# Patient Record
Sex: FEMALE | Race: WHITE | NOT HISPANIC OR LATINO | Employment: FULL TIME | URBAN - METROPOLITAN AREA
[De-identification: names, ages, dates, MRNs, and addresses within clinical notes are randomized per-mention and may not be internally consistent; named-entity substitution may affect disease eponyms.]

---

## 2018-03-07 ENCOUNTER — OFFICE VISIT (OUTPATIENT)
Dept: URGENT CARE | Facility: CLINIC | Age: 28
End: 2018-03-07
Payer: COMMERCIAL

## 2018-03-07 VITALS
HEART RATE: 88 BPM | DIASTOLIC BLOOD PRESSURE: 68 MMHG | RESPIRATION RATE: 20 BRPM | HEIGHT: 67 IN | TEMPERATURE: 97.7 F | SYSTOLIC BLOOD PRESSURE: 110 MMHG | OXYGEN SATURATION: 98 % | BODY MASS INDEX: 23.23 KG/M2 | WEIGHT: 148 LBS

## 2018-03-07 DIAGNOSIS — J06.9 UPPER RESPIRATORY TRACT INFECTION, UNSPECIFIED TYPE: ICD-10-CM

## 2018-03-07 PROCEDURE — 99203 OFFICE O/P NEW LOW 30 MIN: CPT | Mod: 25 | Performed by: PHYSICIAN ASSISTANT

## 2018-03-07 RX ORDER — CODEINE PHOSPHATE AND GUAIFENESIN 10; 100 MG/5ML; MG/5ML
5 SOLUTION ORAL EVERY 4 HOURS PRN
Qty: 120 ML | Refills: 0 | Status: SHIPPED | OUTPATIENT
Start: 2018-03-07 | End: 2018-03-17

## 2018-03-07 RX ORDER — DEXAMETHASONE SODIUM PHOSPHATE 10 MG/ML
10 INJECTION INTRAMUSCULAR; INTRAVENOUS ONCE
Status: COMPLETED | OUTPATIENT
Start: 2018-03-07 | End: 2018-03-07

## 2018-03-07 RX ORDER — AZITHROMYCIN 250 MG/1
TABLET, FILM COATED ORAL
Qty: 6 TAB | Refills: 0 | Status: SHIPPED | OUTPATIENT
Start: 2018-03-07 | End: 2022-01-28

## 2018-03-07 RX ADMIN — DEXAMETHASONE SODIUM PHOSPHATE 10 MG: 10 INJECTION INTRAMUSCULAR; INTRAVENOUS at 18:38

## 2018-03-07 ASSESSMENT — PATIENT HEALTH QUESTIONNAIRE - PHQ9: CLINICAL INTERPRETATION OF PHQ2 SCORE: 0

## 2018-03-07 NOTE — LETTER
March 7, 2018         Patient: Kristel Dela Cruz   YOB: 1990   Date of Visit: 3/7/2018           To Whom it May Concern:    Kristel Dela Cruz was seen in my clinic on 3/7/2018.  Please excuse her from work 3/8-3/9/2018  If you have any questions or concerns, please don't hesitate to call.        Sincerely,           Emilio Olsen P.A.-C.  Electronically Signed

## 2018-03-08 NOTE — PATIENT INSTRUCTIONS
"Upper Respiratory Infection, Adult  Most upper respiratory infections (URIs) are a viral infection of the air passages leading to the lungs. A URI affects the nose, throat, and upper air passages. The most common type of URI is nasopharyngitis and is typically referred to as \"the common cold.\"  URIs run their course and usually go away on their own. Most of the time, a URI does not require medical attention, but sometimes a bacterial infection in the upper airways can follow a viral infection. This is called a secondary infection. Sinus and middle ear infections are common types of secondary upper respiratory infections.  Bacterial pneumonia can also complicate a URI. A URI can worsen asthma and chronic obstructive pulmonary disease (COPD). Sometimes, these complications can require emergency medical care and may be life threatening.  What are the causes?  Almost all URIs are caused by viruses. A virus is a type of germ and can spread from one person to another.  What increases the risk?  You may be at risk for a URI if:  · You smoke.  · You have chronic heart or lung disease.  · You have a weakened defense (immune) system.  · You are very young or very old.  · You have nasal allergies or asthma.  · You work in crowded or poorly ventilated areas.  · You work in health care facilities or schools.  What are the signs or symptoms?  Symptoms typically develop 2-3 days after you come in contact with a cold virus. Most viral URIs last 7-10 days. However, viral URIs from the influenza virus (flu virus) can last 14-18 days and are typically more severe. Symptoms may include:  · Runny or stuffy (congested) nose.  · Sneezing.  · Cough.  · Sore throat.  · Headache.  · Fatigue.  · Fever.  · Loss of appetite.  · Pain in your forehead, behind your eyes, and over your cheekbones (sinus pain).  · Muscle aches.  How is this diagnosed?  Your health care provider may diagnose a URI by:  · Physical exam.  · Tests to check that your " symptoms are not due to another condition such as:  ¨ Strep throat.  ¨ Sinusitis.  ¨ Pneumonia.  ¨ Asthma.  How is this treated?  A URI goes away on its own with time. It cannot be cured with medicines, but medicines may be prescribed or recommended to relieve symptoms. Medicines may help:  · Reduce your fever.  · Reduce your cough.  · Relieve nasal congestion.  Follow these instructions at home:  · Take medicines only as directed by your health care provider.  · Gargle warm saltwater or take cough drops to comfort your throat as directed by your health care provider.  · Use a warm mist humidifier or inhale steam from a shower to increase air moisture. This may make it easier to breathe.  · Drink enough fluid to keep your urine clear or pale yellow.  · Eat soups and other clear broths and maintain good nutrition.  · Rest as needed.  · Return to work when your temperature has returned to normal or as your health care provider advises. You may need to stay home longer to avoid infecting others. You can also use a face mask and careful hand washing to prevent spread of the virus.  · Increase the usage of your inhaler if you have asthma.  · Do not use any tobacco products, including cigarettes, chewing tobacco, or electronic cigarettes. If you need help quitting, ask your health care provider.  How is this prevented?  The best way to protect yourself from getting a cold is to practice good hygiene.  · Avoid oral or hand contact with people with cold symptoms.  · Wash your hands often if contact occurs.  There is no clear evidence that vitamin C, vitamin E, echinacea, or exercise reduces the chance of developing a cold. However, it is always recommended to get plenty of rest, exercise, and practice good nutrition.  Contact a health care provider if:  · You are getting worse rather than better.  · Your symptoms are not controlled by medicine.  · You have chills.  · You have worsening shortness of breath.  · You have brown  or red mucus.  · You have yellow or brown nasal discharge.  · You have pain in your face, especially when you bend forward.  · You have a fever.  · You have swollen neck glands.  · You have pain while swallowing.  · You have white areas in the back of your throat.  Get help right away if:  · You have severe or persistent:  ¨ Headache.  ¨ Ear pain.  ¨ Sinus pain.  ¨ Chest pain.  · You have chronic lung disease and any of the following:  ¨ Wheezing.  ¨ Prolonged cough.  ¨ Coughing up blood.  ¨ A change in your usual mucus.  · You have a stiff neck.  · You have changes in your:  ¨ Vision.  ¨ Hearing.  ¨ Thinking.  ¨ Mood.  This information is not intended to replace advice given to you by your health care provider. Make sure you discuss any questions you have with your health care provider.  Document Released: 06/13/2002 Document Revised: 08/20/2017 Document Reviewed: 03/25/2015  [employer] Interactive Patient Education © 2017 [employer]

## 2018-03-09 ASSESSMENT — ENCOUNTER SYMPTOMS
HOARSE VOICE: 1
SPUTUM PRODUCTION: 1
PALPITATIONS: 0
WHEEZING: 0
SHORTNESS OF BREATH: 0
HEMOPTYSIS: 0
SORE THROAT: 1
FEVER: 0
CHILLS: 1
COUGH: 1

## 2018-03-10 NOTE — PROGRESS NOTES
"Subjective:      Kristel Dela Cruz is a 27 y.o. female who presents with Pharyngitis (X 6 days sorehtroat , hoarseness , stuffy nose .)            Pharyngitis    This is a new problem. The current episode started in the past 7 days. The problem has been unchanged. Associated symptoms include congestion, coughing and a hoarse voice. Pertinent negatives include no ear pain or shortness of breath. She has tried gargles and NSAIDs for the symptoms. The treatment provided mild relief.       Review of Systems   Constitutional: Positive for chills and malaise/fatigue. Negative for fever.   HENT: Positive for congestion, hoarse voice and sore throat. Negative for ear pain.    Respiratory: Positive for cough and sputum production. Negative for hemoptysis, shortness of breath and wheezing.    Cardiovascular: Negative for chest pain and palpitations.   All other systems reviewed and are negative.    PMH:  has no past medical history on file.  MEDS:   Current Outpatient Prescriptions:   •  azithromycin (ZITHROMAX) 250 MG Tab, Take 500 mg (2 Tabs) by mouth on day one; then take 250 mg (1 Tab) by mouth once daily for 4 days., Disp: 6 Tab, Rfl: 0  •  guaifenesin-codeine (CHERATUSSIN AC) Solution oral solution, Take 5 mL by mouth every four hours as needed for Cough for up to 10 days., Disp: 120 mL, Rfl: 0  ALLERGIES: No Known Allergies  SURGHX: History reviewed. No pertinent surgical history.  SOCHX:  reports that she quit smoking about 3 years ago. She has never used smokeless tobacco.  FH: Family history was reviewed, no pertinent findings to report  Medications, Allergies, and current problem list reviewed today in Epic       Objective:     /68   Pulse 88   Temp 36.5 °C (97.7 °F)   Resp 20   Ht 1.702 m (5' 7\")   Wt 67.1 kg (148 lb)   SpO2 98%   BMI 23.18 kg/m²      Physical Exam   Constitutional: She is oriented to person, place, and time. She appears well-developed and well-nourished. She is active.  Non-toxic " appearance. She does not have a sickly appearance. She does not appear ill. No distress. She is not intubated.   HENT:   Head: Normocephalic and atraumatic.   Right Ear: Hearing, tympanic membrane, external ear and ear canal normal.   Left Ear: Hearing, tympanic membrane, external ear and ear canal normal.   Nose: Nose normal.   Mouth/Throat: Uvula is midline, oropharynx is clear and moist and mucous membranes are normal.   Eyes: Conjunctivae, EOM and lids are normal.   Neck: Normal range of motion. Neck supple.   Cardiovascular: Regular rhythm, S1 normal, S2 normal and normal heart sounds.  Exam reveals no gallop and no friction rub.    No murmur heard.  Pulmonary/Chest: Effort normal and breath sounds normal. No accessory muscle usage. No apnea, no tachypnea and no bradypnea. She is not intubated. No respiratory distress. She has no decreased breath sounds. She has no wheezes. She has no rhonchi. She has no rales. She exhibits no tenderness.   Musculoskeletal: Normal range of motion.   Neurological: She is alert and oriented to person, place, and time.   Skin: Skin is warm and dry.   Psychiatric: She has a normal mood and affect. Her speech is normal and behavior is normal. Judgment and thought content normal.   Vitals reviewed.              Assessment/Plan:   Discussed most likely viral etiology.  Contingent antibiotic prescription given to patient to fill upon meeting criteria of guidelines discussed.     1. Upper respiratory tract infection, unspecified type    - azithromycin (ZITHROMAX) 250 MG Tab; Take 500 mg (2 Tabs) by mouth on day one; then take 250 mg (1 Tab) by mouth once daily for 4 days.  Dispense: 6 Tab; Refill: 0  - guaifenesin-codeine (CHERATUSSIN AC) Solution oral solution; Take 5 mL by mouth every four hours as needed for Cough for up to 10 days.  Dispense: 120 mL; Refill: 0  - dexamethasone (DECADRON) injection (check route below) 10 mg; Take 1 mL by mouth Once.    Differential diagnosis, natural  history, supportive care discussed. Follow-up with primary care provider within 7-10 days, emergency room precautions discussed.  Patient and/or family appears understanding of information.

## 2019-03-04 ENCOUNTER — HOSPITAL ENCOUNTER (OUTPATIENT)
Dept: HOSPITAL 8 - STAR | Age: 29
Discharge: HOME | End: 2019-03-04
Attending: OBSTETRICS & GYNECOLOGY
Payer: COMMERCIAL

## 2019-03-04 DIAGNOSIS — G89.29: ICD-10-CM

## 2019-03-04 DIAGNOSIS — N94.5: ICD-10-CM

## 2019-03-04 DIAGNOSIS — Z01.818: Primary | ICD-10-CM

## 2019-03-04 DIAGNOSIS — R10.2: ICD-10-CM

## 2019-03-04 LAB
ANION GAP SERPL CALC-SCNC: 3 MMOL/L (ref 5–15)
BASOPHILS # BLD AUTO: 0.05 X10^3/UL (ref 0–0.1)
BASOPHILS NFR BLD AUTO: 1 % (ref 0–1)
CALCIUM SERPL-MCNC: 8.7 MG/DL (ref 8.5–10.1)
CHLORIDE SERPL-SCNC: 109 MMOL/L (ref 98–107)
CREAT SERPL-MCNC: 0.62 MG/DL (ref 0.55–1.02)
CULTURE INDICATED?: NO
EOSINOPHIL # BLD AUTO: 0.33 X10^3/UL (ref 0–0.4)
EOSINOPHIL NFR BLD AUTO: 4 % (ref 1–7)
ERYTHROCYTE [DISTWIDTH] IN BLOOD BY AUTOMATED COUNT: 13.3 % (ref 9.6–15.2)
LYMPHOCYTES # BLD AUTO: 1.63 X10^3/UL (ref 1–3.4)
LYMPHOCYTES NFR BLD AUTO: 17 % (ref 22–44)
MCH RBC QN AUTO: 31.2 PG (ref 27–34.8)
MCHC RBC AUTO-ENTMCNC: 34.5 G/DL (ref 32.4–35.8)
MCV RBC AUTO: 90.5 FL (ref 80–100)
MD: NO
MICROSCOPIC: (no result)
MONOCYTES # BLD AUTO: 0.48 X10^3/UL (ref 0.2–0.8)
MONOCYTES NFR BLD AUTO: 5 % (ref 2–9)
NEUTROPHILS # BLD AUTO: 7.02 X10^3/UL (ref 1.8–6.8)
NEUTROPHILS NFR BLD AUTO: 74 % (ref 42–75)
PLATELET # BLD AUTO: 272 X10^3/UL (ref 130–400)
PMV BLD AUTO: 9.3 FL (ref 7.4–10.4)
RBC # BLD AUTO: 5.01 X10^6/UL (ref 3.82–5.3)

## 2019-03-04 PROCEDURE — 84703 CHORIONIC GONADOTROPIN ASSAY: CPT

## 2019-03-04 PROCEDURE — 36415 COLL VENOUS BLD VENIPUNCTURE: CPT

## 2019-03-04 PROCEDURE — 85025 COMPLETE CBC W/AUTO DIFF WBC: CPT

## 2019-03-04 PROCEDURE — 80048 BASIC METABOLIC PNL TOTAL CA: CPT

## 2019-03-04 PROCEDURE — 81003 URINALYSIS AUTO W/O SCOPE: CPT

## 2019-03-08 ENCOUNTER — HOSPITAL ENCOUNTER (OUTPATIENT)
Dept: HOSPITAL 8 - OUT | Age: 29
Discharge: HOME | End: 2019-03-08
Attending: OBSTETRICS & GYNECOLOGY
Payer: COMMERCIAL

## 2019-03-08 VITALS — BODY MASS INDEX: 23.77 KG/M2 | WEIGHT: 151.46 LBS | HEIGHT: 67 IN

## 2019-03-08 VITALS — DIASTOLIC BLOOD PRESSURE: 76 MMHG | SYSTOLIC BLOOD PRESSURE: 125 MMHG

## 2019-03-08 DIAGNOSIS — N80.2: ICD-10-CM

## 2019-03-08 DIAGNOSIS — N94.6: Primary | ICD-10-CM

## 2019-03-08 DIAGNOSIS — N83.8: ICD-10-CM

## 2019-03-08 DIAGNOSIS — Z88.8: ICD-10-CM

## 2019-03-08 LAB — HCG UR SG: 1.02 (ref 1–1.03)

## 2019-03-08 PROCEDURE — 88305 TISSUE EXAM BY PATHOLOGIST: CPT

## 2019-03-08 PROCEDURE — 88304 TISSUE EXAM BY PATHOLOGIST: CPT

## 2019-03-08 PROCEDURE — 58662 LAPAROSCOPY EXCISE LESIONS: CPT

## 2019-03-08 PROCEDURE — 58350 REOPEN FALLOPIAN TUBE: CPT

## 2019-03-08 PROCEDURE — 81025 URINE PREGNANCY TEST: CPT

## 2019-03-08 RX ADMIN — FENTANYL CITRATE PRN MCG: 50 INJECTION INTRAMUSCULAR; INTRAVENOUS at 09:07

## 2019-03-08 RX ADMIN — FENTANYL CITRATE PRN MCG: 50 INJECTION INTRAMUSCULAR; INTRAVENOUS at 09:17

## 2019-03-08 RX ADMIN — FENTANYL CITRATE PRN MCG: 50 INJECTION INTRAMUSCULAR; INTRAVENOUS at 09:26

## 2020-10-24 ENCOUNTER — HOSPITAL ENCOUNTER (EMERGENCY)
Dept: HOSPITAL 8 - ED | Age: 30
Discharge: HOME | End: 2020-10-24
Payer: COMMERCIAL

## 2020-10-24 VITALS — SYSTOLIC BLOOD PRESSURE: 105 MMHG | DIASTOLIC BLOOD PRESSURE: 54 MMHG

## 2020-10-24 VITALS — WEIGHT: 194.45 LBS | BODY MASS INDEX: 30.52 KG/M2 | HEIGHT: 67 IN

## 2020-10-24 DIAGNOSIS — N93.8: Primary | ICD-10-CM

## 2020-10-24 LAB
ALBUMIN SERPL-MCNC: 3.8 G/DL (ref 3.4–5)
ALP SERPL-CCNC: 46 U/L (ref 45–117)
ALT SERPL-CCNC: 20 U/L (ref 12–78)
ANION GAP SERPL CALC-SCNC: 8 MMOL/L (ref 5–15)
BASOPHILS # BLD AUTO: 0 X10^3/UL (ref 0–0.1)
BASOPHILS NFR BLD AUTO: 1 % (ref 0–1)
BILIRUB SERPL-MCNC: 0.6 MG/DL (ref 0.2–1)
CALCIUM SERPL-MCNC: 8.8 MG/DL (ref 8.5–10.1)
CHLORIDE SERPL-SCNC: 108 MMOL/L (ref 98–107)
CLUE CELLS: (no result)
CREAT SERPL-MCNC: 0.78 MG/DL (ref 0.55–1.02)
EOSINOPHIL # BLD AUTO: 0.1 X10^3/UL (ref 0–0.4)
EOSINOPHIL NFR BLD AUTO: 1 % (ref 1–7)
ERYTHROCYTE [DISTWIDTH] IN BLOOD BY AUTOMATED COUNT: 12.8 % (ref 9.6–15.2)
LYMPHOCYTES # BLD AUTO: 1.7 X10^3/UL (ref 1–3.4)
LYMPHOCYTES NFR BLD AUTO: 23 % (ref 22–44)
MCH RBC QN AUTO: 30.6 PG (ref 27–34.8)
MCHC RBC AUTO-ENTMCNC: 33.3 G/DL (ref 32.4–35.8)
MD: NO
MICROSCOPIC: (no result)
MONOCYTES # BLD AUTO: 0.4 X10^3/UL (ref 0.2–0.8)
MONOCYTES NFR BLD AUTO: 6 % (ref 2–9)
NEUTROPHILS # BLD AUTO: 5.3 X10^3/UL (ref 1.8–6.8)
NEUTROPHILS NFR BLD AUTO: 70 % (ref 42–75)
PLATELET # BLD AUTO: 275 X10^3/UL (ref 130–400)
PMV BLD AUTO: 9 FL (ref 7.4–10.4)
PROT SERPL-MCNC: 7.1 G/DL (ref 6.4–8.2)
RBC # BLD AUTO: 4.62 X10^6/UL (ref 3.82–5.3)
T VAGINALIS RRNA GENITAL QL PROBE: (no result)
WET PREP WBCS: (no result)

## 2020-10-24 PROCEDURE — 87591 N.GONORRHOEAE DNA AMP PROB: CPT

## 2020-10-24 PROCEDURE — 76830 TRANSVAGINAL US NON-OB: CPT

## 2020-10-24 PROCEDURE — 80053 COMPREHEN METABOLIC PANEL: CPT

## 2020-10-24 PROCEDURE — 36415 COLL VENOUS BLD VENIPUNCTURE: CPT

## 2020-10-24 PROCEDURE — 87491 CHLMYD TRACH DNA AMP PROBE: CPT

## 2020-10-24 PROCEDURE — 84703 CHORIONIC GONADOTROPIN ASSAY: CPT

## 2020-10-24 PROCEDURE — 87210 SMEAR WET MOUNT SALINE/INK: CPT

## 2020-10-24 PROCEDURE — 85025 COMPLETE CBC W/AUTO DIFF WBC: CPT

## 2020-10-24 PROCEDURE — 99284 EMERGENCY DEPT VISIT MOD MDM: CPT

## 2020-10-24 PROCEDURE — 87808 TRICHOMONAS ASSAY W/OPTIC: CPT

## 2020-10-24 PROCEDURE — 81003 URINALYSIS AUTO W/O SCOPE: CPT

## 2020-10-24 NOTE — NUR
LAPROSCOPY FOR ENDOMETRIOSIS IN 2019.  FREQUENT UTI'S SINCE PROCEDURE.  BRIGHT 
BLOOD W/ INTERCOURSE. HAS BEEN TO URGENT CARE MULTIPLE TIMES.  SAW DR GONSALEZ 
FOR SX.  DENIES STD HX.  DENIES PARTNER HAS STD HX.  "I JUST KNOW SOMETHING'S 
WRONG".  CURRENTLY C/O PAIN ACROSS LOWER BACK.  TYLENOL & ADVIL AT 0630.  + 
NAUSEA LAST NOC & THIS AM.  DENIES VOMITING, DIARRHEA, CONSTIPATION.   LMP: 2 
MONTHS AGO.  TAKING BIRTH CONTROL: WHITNEY.  GYN INCREASED WHITNEY DOSAGE RECENTLY.

## 2021-05-06 ENCOUNTER — IMMUNIZATION (OUTPATIENT)
Dept: FAMILY PLANNING/WOMEN'S HEALTH CLINIC | Facility: IMMUNIZATION CENTER | Age: 31
End: 2021-05-06
Payer: COMMERCIAL

## 2021-05-06 DIAGNOSIS — Z23 ENCOUNTER FOR VACCINATION: Primary | ICD-10-CM

## 2021-05-06 PROCEDURE — 91300 PFIZER SARS-COV-2 VACCINE: CPT

## 2021-05-06 PROCEDURE — 0001A PFIZER SARS-COV-2 VACCINE: CPT

## 2021-05-27 ENCOUNTER — IMMUNIZATION (OUTPATIENT)
Dept: FAMILY PLANNING/WOMEN'S HEALTH CLINIC | Facility: IMMUNIZATION CENTER | Age: 31
End: 2021-05-27
Attending: INTERNAL MEDICINE
Payer: COMMERCIAL

## 2021-05-27 DIAGNOSIS — Z23 ENCOUNTER FOR VACCINATION: Primary | ICD-10-CM

## 2021-05-27 PROCEDURE — 0002A PFIZER SARS-COV-2 VACCINE: CPT | Performed by: INTERNAL MEDICINE

## 2021-05-27 PROCEDURE — 91300 PFIZER SARS-COV-2 VACCINE: CPT | Performed by: INTERNAL MEDICINE

## 2022-01-28 ENCOUNTER — TELEMEDICINE (OUTPATIENT)
Dept: MEDICAL GROUP | Facility: IMAGING CENTER | Age: 32
End: 2022-01-28
Payer: COMMERCIAL

## 2022-01-28 VITALS — HEIGHT: 67 IN | BODY MASS INDEX: 24.33 KG/M2 | HEART RATE: 82 BPM | WEIGHT: 155 LBS

## 2022-01-28 DIAGNOSIS — J45.21 MILD INTERMITTENT ASTHMA WITH ACUTE EXACERBATION: ICD-10-CM

## 2022-01-28 DIAGNOSIS — Z76.89 ENCOUNTER TO ESTABLISH CARE WITH NEW DOCTOR: ICD-10-CM

## 2022-01-28 DIAGNOSIS — J18.9 COMMUNITY ACQUIRED PNEUMONIA, UNSPECIFIED LATERALITY: ICD-10-CM

## 2022-01-28 DIAGNOSIS — U07.1 COVID-19: ICD-10-CM

## 2022-01-28 PROBLEM — S92.353A CLOSED FRACTURE OF FIFTH METATARSAL BONE: Status: ACTIVE | Noted: 2021-12-29

## 2022-01-28 PROBLEM — J45.20 MILD INTERMITTENT ASTHMA: Status: ACTIVE | Noted: 2022-01-28

## 2022-01-28 PROCEDURE — 99214 OFFICE O/P EST MOD 30 MIN: CPT | Mod: 95 | Performed by: CLINICAL NURSE SPECIALIST

## 2022-01-28 RX ORDER — HYDROCODONE BITARTRATE AND ACETAMINOPHEN 5; 325 MG/1; MG/1
TABLET ORAL
COMMUNITY
End: 2023-06-19

## 2022-01-28 RX ORDER — TRAMADOL HYDROCHLORIDE 50 MG/1
TABLET ORAL
COMMUNITY
End: 2023-06-19

## 2022-01-28 RX ORDER — PHENAZOPYRIDINE HYDROCHLORIDE 200 MG/1
TABLET, FILM COATED ORAL
COMMUNITY
End: 2023-06-19

## 2022-01-28 RX ORDER — CITALOPRAM 20 MG/1
20 TABLET ORAL DAILY
COMMUNITY
Start: 2021-12-06 | End: 2023-06-19

## 2022-01-28 RX ORDER — IPRATROPIUM BROMIDE AND ALBUTEROL SULFATE 2.5; .5 MG/3ML; MG/3ML
3 SOLUTION RESPIRATORY (INHALATION) EVERY 4 HOURS PRN
Qty: 126 ML | Refills: 1 | Status: SHIPPED | OUTPATIENT
Start: 2022-01-28 | End: 2022-02-04

## 2022-01-28 RX ORDER — AZITHROMYCIN 250 MG/1
250 TABLET, FILM COATED ORAL DAILY
Qty: 6 TABLET | Refills: 0 | Status: SHIPPED | OUTPATIENT
Start: 2022-01-28 | End: 2023-06-19

## 2022-01-28 RX ORDER — ALBUTEROL SULFATE 90 UG/1
AEROSOL, METERED RESPIRATORY (INHALATION)
COMMUNITY
End: 2023-06-19 | Stop reason: SDUPTHER

## 2022-01-28 RX ORDER — MONTELUKAST SODIUM 10 MG/1
TABLET ORAL DAILY
COMMUNITY
Start: 2021-11-30

## 2022-01-28 ASSESSMENT — PAIN SCALES - GENERAL: PAINLEVEL: 4=SLIGHT-MODERATE PAIN

## 2022-01-28 NOTE — ASSESSMENT & PLAN NOTE
She usually takes Singluair and uses the albuterol inhaler intermittently. Lately she has been using albuterol 1-2 times when going to sleep.

## 2022-01-28 NOTE — PROGRESS NOTES
Telemedicine: Established Patient   This evaluation was conducted via Zoom using secure and encrypted videoconferencing technology. The patient was in a private location in the state of [place].    The patient's identity was confirmed and verbal consent was obtained for this virtual visit.    Subjective:   CC:   Chief Complaint   Patient presents with   • Establish Care   • Fever     covid last week   • Body Aches     x 1 week    • Pharyngitis     lost voice        HPI:  Judson was diagnosed with COVID-19 about 10 days ago and still has sore throat, loss of voice, chest congestion, nausea, small dry cough, low grade fevers, headaches, myalgias, SOB with laying flat or walking up the stairs.  Lost voice one weak ago.  No change in taste or smell, vomiting, diarrhea, joint pain.  She took Advil, DayQuil, Tylenol. Advil has worked the best.  Steamy showers help but do not relieve phlegm.  NO cough syrup, but DayQuil which does not help.  Drinking lots of water.  Boyfriend had COVID as well but he has resolved. She took azithromycin one month ago for a UTI.    ROS  See hPI    No Known Allergies    Current medicines (including changes today)  Current Outpatient Medications   Medication Sig Dispense Refill   • citalopram (CELEXA) 20 MG Tab      • montelukast (SINGULAIR) 10 MG Tab      • albuterol (VENTOLIN HFA) 108 (90 Base) MCG/ACT Aero Soln inhalation aerosol Ventolin HFA 90 mcg/actuation aerosol inhaler     • azithromycin (ZITHROMAX) 250 MG Tab Take 1 Tablet by mouth every day. 6 Tablet 0   • Nebulizers (COMPRESSOR/NEBULIZER) Misc 1 Each one time for 1 dose. 1 Each 0   • ipratropium-albuterol (DUONEB) 0.5-2.5 (3) MG/3ML nebulizer solution Take 3 mL by nebulization every four hours as needed for Shortness of Breath for up to 7 days. 126 mL 1     No current facility-administered medications for this visit.       Patient Active Problem List    Diagnosis Date Noted   • Mild intermittent asthma 01/28/2022   • Closed fracture of  fifth metatarsal bone 12/29/2021         History reviewed. No pertinent family history.    She  has no past medical history on file.  She  has no past surgical history on file.       Objective:     Physical Exam     Vitals obtained by patient:     Vitals:    01/28/22 1415   Pulse: 82       Constitutional: Alert, no distress, well-groomed.  Skin: No rashes in visible areas.  Eye: Round. Conjunctiva clear, lids normal. No icterus.   ENMT: Lips pink without lesions, good dentition, moist mucous membranes. Unable to speak above a whisper. No tonsillar enlargement or exudate. Submandibular lymph tender  Neck: No masses, no thyromegaly. Moves freely without pain.  CV: Pulse as reported by patient  Respiratory: Unlabored respiratory effort, no cough or audible wheeze  Psych: Alert and oriented x4, normal affect and mood.     Assessment and Plan:   The following treatment plan was discussed:     1. Encounter to establish care with new doctor  Judson is establishing care. She was diagnosed with COVID-19 10 days ago and felt she was improving but then worsened.    2. Mild intermittent asthma with acute exacerbation  Judson takes Singulair daily and albuterol as needed. She has increased her albuterol usage with her recent shortness of breath, 1-2 times nightly. During the visit, she was not in apparent respiratory distress although she reported a feeling of SOB.    3. COVID-19  Judson was diagnosed 10 days ago with COVID-19. She said she felt she was getting better yesterday but woke up today and felt poorly again. She has had intermittent low-grade fevers throughout, sore throat, mild cough, shortness of breath especially when supine or walking upstairs. No color change in her fingers and toes. Her breathing was unlabored during the visit. Tonsils were visualized and not enlarged, without exudate. It is unlikely she has concurrent strep. With the improvement and then worsening of symptoms including continued fevers, a feeling of  heavines and chest congestion, I am concerned she may have developed pneumonia. See #4 for pneumonia plan.    To soothe the throat, drink warm, herbal tea such as Throat Coat or ginger with honey and lemon. For cough, take guaifenesin as needed during the day and use throat lozenges such as Ricola.  Take 5-10 deep breaths intermittently throughout the day and move the body as tolerated to aid in respiration. Take Advil or Tylenol as needed for fever and body aches. Drink 2 to 3 L of water a day.    Judson should use the nebulizer up to four times a day to help with her breathing.     - Nebulizers (COMPRESSOR/NEBULIZER) Misc; 1 Each one time for 1 dose.  Dispense: 1 Each; Refill: 0  - ipratropium-albuterol (DUONEB) 0.5-2.5 (3) MG/3ML nebulizer solution; Take 3 mL by nebulization every four hours as needed for Shortness of Breath for up to 7 days.  Dispense: 126 mL; Refill: 1    4. Community acquired pneumonia, unspecified laterality  Judson may have pneumonia secondary to her COVID-19. Her symptoms improved and then worsened. She continues with chest congestion, dry cough unable to expectorate phlegm, low-grade fevers, body aches. She will be treated empirically and given a course of azithromycin. We will check in again at the completion of this medication. If this does not improve her symptoms, we will consider chest x-ray and potentially burst of steroids.  - azithromycin (ZITHROMAX) 250 MG Tab; Take 1 Tablet by mouth every day.  Dispense: 6 Tablet; Refill: 0    Return in about 5 days (around 2/2/2022), or if symptoms worsen or fail to improve, for treatment response.      Follow-up: Return in about 5 days (around 2/2/2022), or if symptoms worsen or fail to improve, for treatment response.

## 2022-03-03 ENCOUNTER — PRE-ADMISSION TESTING (OUTPATIENT)
Dept: ADMISSIONS | Facility: MEDICAL CENTER | Age: 32
End: 2022-03-03
Attending: ORTHOPAEDIC SURGERY
Payer: COMMERCIAL

## 2022-03-03 VITALS — WEIGHT: 155 LBS | BODY MASS INDEX: 24.33 KG/M2 | HEIGHT: 67 IN

## 2022-03-03 NOTE — DISCHARGE PLANNING
"DISCHARGE PLANNING NOTE -    Procedure: Procedure(s):  ORIF, FOOT - 5TH METATARSAL  Procedure Date: 3/4/2022  Insurance: Payor: AETNA / Plan: AETNA / Product Type: Multiple /    Equipment currently available at home?  crutches and knee scooter   Steps into the home? 0  Steps within the home?   Toilet height? Standard  5'7\"  Type of shower? tub-shower  Who will be with you during your recovery? mother, father  Is Outpatient Physical Therapy set up after surgery? No         Plan: Spoke with pt via telephone during preadmission appt. She has currently been NWB for 3 months.  Per pt, she will continue to be NWB for 4 weeks after surgery. She has no concerns at this time and has been managing well at home.   "

## 2022-03-04 ENCOUNTER — ANESTHESIA (OUTPATIENT)
Dept: SURGERY | Facility: MEDICAL CENTER | Age: 32
End: 2022-03-04
Payer: COMMERCIAL

## 2022-03-04 ENCOUNTER — HOSPITAL ENCOUNTER (OUTPATIENT)
Facility: MEDICAL CENTER | Age: 32
End: 2022-03-04
Attending: ORTHOPAEDIC SURGERY | Admitting: ORTHOPAEDIC SURGERY
Payer: COMMERCIAL

## 2022-03-04 ENCOUNTER — ANESTHESIA EVENT (OUTPATIENT)
Dept: SURGERY | Facility: MEDICAL CENTER | Age: 32
End: 2022-03-04
Payer: COMMERCIAL

## 2022-03-04 ENCOUNTER — APPOINTMENT (OUTPATIENT)
Dept: RADIOLOGY | Facility: MEDICAL CENTER | Age: 32
End: 2022-03-04
Attending: ORTHOPAEDIC SURGERY
Payer: COMMERCIAL

## 2022-03-04 VITALS
BODY MASS INDEX: 25.29 KG/M2 | TEMPERATURE: 98.6 F | OXYGEN SATURATION: 93 % | HEIGHT: 67 IN | DIASTOLIC BLOOD PRESSURE: 56 MMHG | HEART RATE: 68 BPM | SYSTOLIC BLOOD PRESSURE: 107 MMHG | WEIGHT: 161.16 LBS | RESPIRATION RATE: 16 BRPM

## 2022-03-04 LAB
EXTERNAL QUALITY CONTROL: NORMAL
HCG SERPL QL: NEGATIVE
SARS-COV+SARS-COV-2 AG RESP QL IA.RAPID: NEGATIVE

## 2022-03-04 PROCEDURE — C1713 ANCHOR/SCREW BN/BN,TIS/BN: HCPCS | Performed by: ORTHOPAEDIC SURGERY

## 2022-03-04 PROCEDURE — 160039 HCHG SURGERY MINUTES - EA ADDL 1 MIN LEVEL 3: Performed by: ORTHOPAEDIC SURGERY

## 2022-03-04 PROCEDURE — 160028 HCHG SURGERY MINUTES - 1ST 30 MINS LEVEL 3: Performed by: ORTHOPAEDIC SURGERY

## 2022-03-04 PROCEDURE — 160009 HCHG ANES TIME/MIN: Performed by: ORTHOPAEDIC SURGERY

## 2022-03-04 PROCEDURE — 160035 HCHG PACU - 1ST 60 MINS PHASE I: Performed by: ORTHOPAEDIC SURGERY

## 2022-03-04 PROCEDURE — 700111 HCHG RX REV CODE 636 W/ 250 OVERRIDE (IP): Performed by: ANESTHESIOLOGY

## 2022-03-04 PROCEDURE — 502240 HCHG MISC OR SUPPLY RC 0272: Performed by: ORTHOPAEDIC SURGERY

## 2022-03-04 PROCEDURE — 700105 HCHG RX REV CODE 258: Performed by: ORTHOPAEDIC SURGERY

## 2022-03-04 PROCEDURE — 501838 HCHG SUTURE GENERAL: Performed by: ORTHOPAEDIC SURGERY

## 2022-03-04 PROCEDURE — 160002 HCHG RECOVERY MINUTES (STAT): Performed by: ORTHOPAEDIC SURGERY

## 2022-03-04 PROCEDURE — 500881 HCHG PACK, EXTREMITY: Performed by: ORTHOPAEDIC SURGERY

## 2022-03-04 PROCEDURE — 84703 CHORIONIC GONADOTROPIN ASSAY: CPT

## 2022-03-04 PROCEDURE — 700101 HCHG RX REV CODE 250: Performed by: ORTHOPAEDIC SURGERY

## 2022-03-04 PROCEDURE — A9270 NON-COVERED ITEM OR SERVICE: HCPCS | Performed by: ANESTHESIOLOGY

## 2022-03-04 PROCEDURE — 36415 COLL VENOUS BLD VENIPUNCTURE: CPT

## 2022-03-04 PROCEDURE — 160025 RECOVERY II MINUTES (STATS): Performed by: ORTHOPAEDIC SURGERY

## 2022-03-04 PROCEDURE — 160046 HCHG PACU - 1ST 60 MINS PHASE II: Performed by: ORTHOPAEDIC SURGERY

## 2022-03-04 PROCEDURE — 700102 HCHG RX REV CODE 250 W/ 637 OVERRIDE(OP): Performed by: ANESTHESIOLOGY

## 2022-03-04 PROCEDURE — 160048 HCHG OR STATISTICAL LEVEL 1-5: Performed by: ORTHOPAEDIC SURGERY

## 2022-03-04 PROCEDURE — A9270 NON-COVERED ITEM OR SERVICE: HCPCS

## 2022-03-04 PROCEDURE — 73630 X-RAY EXAM OF FOOT: CPT | Mod: LT

## 2022-03-04 PROCEDURE — 87426 SARSCOV CORONAVIRUS AG IA: CPT | Performed by: ORTHOPAEDIC SURGERY

## 2022-03-04 PROCEDURE — 502000 HCHG MISC OR IMPLANTS RC 0278: Performed by: ORTHOPAEDIC SURGERY

## 2022-03-04 PROCEDURE — 700101 HCHG RX REV CODE 250: Performed by: ANESTHESIOLOGY

## 2022-03-04 PROCEDURE — 700102 HCHG RX REV CODE 250 W/ 637 OVERRIDE(OP)

## 2022-03-04 DEVICE — IMPLANTABLE DEVICE: Type: IMPLANTABLE DEVICE | Site: FOOT | Status: FUNCTIONAL

## 2022-03-04 RX ORDER — DIPHENHYDRAMINE HYDROCHLORIDE 50 MG/ML
12.5 INJECTION INTRAMUSCULAR; INTRAVENOUS
Status: DISCONTINUED | OUTPATIENT
Start: 2022-03-04 | End: 2022-03-04 | Stop reason: HOSPADM

## 2022-03-04 RX ORDER — LIDOCAINE HYDROCHLORIDE 20 MG/ML
INJECTION, SOLUTION EPIDURAL; INFILTRATION; INTRACAUDAL; PERINEURAL PRN
Status: DISCONTINUED | OUTPATIENT
Start: 2022-03-04 | End: 2022-03-04 | Stop reason: SURG

## 2022-03-04 RX ORDER — BUPIVACAINE HYDROCHLORIDE 5 MG/ML
INJECTION, SOLUTION EPIDURAL; INTRACAUDAL
Status: DISCONTINUED
Start: 2022-03-04 | End: 2022-03-04 | Stop reason: HOSPADM

## 2022-03-04 RX ORDER — ONDANSETRON 2 MG/ML
INJECTION INTRAMUSCULAR; INTRAVENOUS PRN
Status: DISCONTINUED | OUTPATIENT
Start: 2022-03-04 | End: 2022-03-04 | Stop reason: SURG

## 2022-03-04 RX ORDER — HYDROMORPHONE HYDROCHLORIDE 1 MG/ML
0.2 INJECTION, SOLUTION INTRAMUSCULAR; INTRAVENOUS; SUBCUTANEOUS
Status: DISCONTINUED | OUTPATIENT
Start: 2022-03-04 | End: 2022-03-04 | Stop reason: HOSPADM

## 2022-03-04 RX ORDER — OXYCODONE HCL 5 MG/5 ML
5 SOLUTION, ORAL ORAL
Status: COMPLETED | OUTPATIENT
Start: 2022-03-04 | End: 2022-03-04

## 2022-03-04 RX ORDER — MEPERIDINE HYDROCHLORIDE 25 MG/ML
12.5 INJECTION INTRAMUSCULAR; INTRAVENOUS; SUBCUTANEOUS
Status: DISCONTINUED | OUTPATIENT
Start: 2022-03-04 | End: 2022-03-04 | Stop reason: HOSPADM

## 2022-03-04 RX ORDER — MIDAZOLAM HYDROCHLORIDE 1 MG/ML
1 INJECTION INTRAMUSCULAR; INTRAVENOUS
Status: DISCONTINUED | OUTPATIENT
Start: 2022-03-04 | End: 2022-03-04 | Stop reason: HOSPADM

## 2022-03-04 RX ORDER — DEXAMETHASONE SODIUM PHOSPHATE 4 MG/ML
INJECTION, SOLUTION INTRA-ARTICULAR; INTRALESIONAL; INTRAMUSCULAR; INTRAVENOUS; SOFT TISSUE PRN
Status: DISCONTINUED | OUTPATIENT
Start: 2022-03-04 | End: 2022-03-04 | Stop reason: SURG

## 2022-03-04 RX ORDER — HYDROMORPHONE HYDROCHLORIDE 1 MG/ML
0.1 INJECTION, SOLUTION INTRAMUSCULAR; INTRAVENOUS; SUBCUTANEOUS
Status: DISCONTINUED | OUTPATIENT
Start: 2022-03-04 | End: 2022-03-04 | Stop reason: HOSPADM

## 2022-03-04 RX ORDER — OXYCODONE HCL 5 MG/5 ML
10 SOLUTION, ORAL ORAL
Status: COMPLETED | OUTPATIENT
Start: 2022-03-04 | End: 2022-03-04

## 2022-03-04 RX ORDER — ACETAMINOPHEN 500 MG
1000 TABLET ORAL ONCE
Status: COMPLETED | OUTPATIENT
Start: 2022-03-04 | End: 2022-03-04

## 2022-03-04 RX ORDER — HALOPERIDOL 5 MG/ML
1 INJECTION INTRAMUSCULAR
Status: DISCONTINUED | OUTPATIENT
Start: 2022-03-04 | End: 2022-03-04 | Stop reason: HOSPADM

## 2022-03-04 RX ORDER — ONDANSETRON 2 MG/ML
4 INJECTION INTRAMUSCULAR; INTRAVENOUS
Status: DISCONTINUED | OUTPATIENT
Start: 2022-03-04 | End: 2022-03-04 | Stop reason: HOSPADM

## 2022-03-04 RX ORDER — LABETALOL HYDROCHLORIDE 5 MG/ML
5 INJECTION, SOLUTION INTRAVENOUS
Status: DISCONTINUED | OUTPATIENT
Start: 2022-03-04 | End: 2022-03-04 | Stop reason: HOSPADM

## 2022-03-04 RX ORDER — CELECOXIB 200 MG/1
400 CAPSULE ORAL ONCE
Status: COMPLETED | OUTPATIENT
Start: 2022-03-04 | End: 2022-03-04

## 2022-03-04 RX ORDER — CEFAZOLIN SODIUM 1 G/3ML
INJECTION, POWDER, FOR SOLUTION INTRAMUSCULAR; INTRAVENOUS PRN
Status: DISCONTINUED | OUTPATIENT
Start: 2022-03-04 | End: 2022-03-04 | Stop reason: SURG

## 2022-03-04 RX ORDER — SODIUM CHLORIDE, SODIUM LACTATE, POTASSIUM CHLORIDE, CALCIUM CHLORIDE 600; 310; 30; 20 MG/100ML; MG/100ML; MG/100ML; MG/100ML
INJECTION, SOLUTION INTRAVENOUS CONTINUOUS
Status: DISCONTINUED | OUTPATIENT
Start: 2022-03-04 | End: 2022-03-04 | Stop reason: HOSPADM

## 2022-03-04 RX ORDER — BUPIVACAINE HYDROCHLORIDE 5 MG/ML
INJECTION, SOLUTION EPIDURAL; INTRACAUDAL
Status: DISCONTINUED | OUTPATIENT
Start: 2022-03-04 | End: 2022-03-04 | Stop reason: HOSPADM

## 2022-03-04 RX ORDER — HYDROMORPHONE HYDROCHLORIDE 1 MG/ML
0.4 INJECTION, SOLUTION INTRAMUSCULAR; INTRAVENOUS; SUBCUTANEOUS
Status: DISCONTINUED | OUTPATIENT
Start: 2022-03-04 | End: 2022-03-04 | Stop reason: HOSPADM

## 2022-03-04 RX ORDER — CELECOXIB 200 MG/1
CAPSULE ORAL
Status: COMPLETED
Start: 2022-03-04 | End: 2022-03-04

## 2022-03-04 RX ADMIN — OXYCODONE HYDROCHLORIDE 5 MG: 5 SOLUTION ORAL at 11:52

## 2022-03-04 RX ADMIN — FENTANYL CITRATE 50 MCG: 50 INJECTION, SOLUTION INTRAMUSCULAR; INTRAVENOUS at 10:56

## 2022-03-04 RX ADMIN — ONDANSETRON 4 MG: 2 INJECTION INTRAMUSCULAR; INTRAVENOUS at 09:37

## 2022-03-04 RX ADMIN — CELECOXIB 400 MG: 200 CAPSULE ORAL at 08:41

## 2022-03-04 RX ADMIN — DEXAMETHASONE SODIUM PHOSPHATE 8 MG: 4 INJECTION, SOLUTION INTRA-ARTICULAR; INTRALESIONAL; INTRAMUSCULAR; INTRAVENOUS; SOFT TISSUE at 09:37

## 2022-03-04 RX ADMIN — PROPOFOL 120 MG: 10 INJECTION, EMULSION INTRAVENOUS at 09:37

## 2022-03-04 RX ADMIN — CEFAZOLIN 2 G: 330 INJECTION, POWDER, FOR SOLUTION INTRAMUSCULAR; INTRAVENOUS at 09:39

## 2022-03-04 RX ADMIN — ACETAMINOPHEN 1000 MG: 500 TABLET ORAL at 08:41

## 2022-03-04 RX ADMIN — SODIUM CHLORIDE, POTASSIUM CHLORIDE, SODIUM LACTATE AND CALCIUM CHLORIDE: 600; 310; 30; 20 INJECTION, SOLUTION INTRAVENOUS at 09:32

## 2022-03-04 RX ADMIN — FENTANYL CITRATE 50 MCG: 50 INJECTION, SOLUTION INTRAMUSCULAR; INTRAVENOUS at 11:08

## 2022-03-04 RX ADMIN — SODIUM CHLORIDE, POTASSIUM CHLORIDE, SODIUM LACTATE AND CALCIUM CHLORIDE: 600; 310; 30; 20 INJECTION, SOLUTION INTRAVENOUS at 08:40

## 2022-03-04 RX ADMIN — LIDOCAINE HYDROCHLORIDE 100 MG: 20 INJECTION, SOLUTION EPIDURAL; INFILTRATION; INTRACAUDAL at 09:37

## 2022-03-04 ASSESSMENT — PAIN DESCRIPTION - PAIN TYPE
TYPE: SURGICAL PAIN

## 2022-03-04 NOTE — ANESTHESIA PROCEDURE NOTES
Airway    Date/Time: 3/4/2022 9:38 AM  Performed by: Afua Joshi M.D.  Authorized by: Afua Joshi M.D.     Location:  OR  Urgency:  Elective  Indications for Airway Management:  Anesthesia      Spontaneous Ventilation: absent    Sedation Level:  Deep  Preoxygenated: Yes    Mask Difficulty Assessment:  1 - vent by mask  Final Airway Type:  Supraglottic airway  Final Supraglottic Airway:  Standard LMA    SGA Size:  4  Number of Attempts at Approach:  1

## 2022-03-04 NOTE — ANESTHESIA PREPROCEDURE EVALUATION
0904 Case: [id number] Date/Time: 03/04/22 0915    Procedure: ORIF, FOOT - 5TH METATARSAL (Left )    Pre-op diagnosis: CLOSED FRACTURE OF FIFTH METATARSAL BONE OF LEFT FOOT    Location: [facility] ROOM 23 / SURGERY SAME DAY [facility]    Surgeons: Vinicio Iraheta M.D.      31yoF with asthma    No AC  Allergies to food  NPO      Relevant Problems   PULMONARY   (positive) Mild intermittent asthma       Physical Exam    Airway   Mallampati: II       Cardiovascular - normal exam     Dental - normal exam           Pulmonary - normal exam     Abdominal - normal exam     Neurological - normal exam                 Anesthesia Plan    ASA 2       Plan - general       Airway plan will be LMA          Induction: intravenous    Postoperative Plan: Postoperative administration of opioids is intended.    Pertinent diagnostic labs and testing reviewed    Informed Consent:    Anesthetic plan and risks discussed with patient.

## 2022-03-04 NOTE — DISCHARGE INSTRUCTIONS
ACTIVITY: Rest and take it easy for the first 24 hours.  A responsible adult is recommended to remain with you during that time.  It is normal to feel sleepy.  We encourage you to not do anything that requires balance, judgment or coordination.    MILD FLU-LIKE SYMPTOMS ARE NORMAL. YOU MAY EXPERIENCE GENERALIZED MUSCLE ACHES, THROAT IRRITATION, HEADACHE AND/OR SOME NAUSEA.    FOR 24 HOURS DO NOT:  Drive, operate machinery or run household appliances.  Drink beer or alcoholic beverages.   Make important decisions or sign legal documents.    SPECIAL INSTRUCTIONS: Non-weight bearing on left foot; may bathe with incision covered     DIET: To avoid nausea, slowly advance diet as tolerated, avoiding spicy or greasy foods for the first day.  Add more substantial food to your diet according to your physician's instructions.  Babies can be fed formula or breast milk as soon as they are hungry.  INCREASE FLUIDS AND FIBER TO AVOID CONSTIPATION.    FOLLOW-UP APPOINTMENT:  A follow-up appointment should be arranged with your doctor; call to schedule.    You should CALL YOUR PHYSICIAN if you develop:  Fever greater than 101 degrees F.  Pain not relieved by medication, or persistent nausea or vomiting.  Excessive bleeding (blood soaking through dressing) or unexpected drainage from the wound.  Extreme redness or swelling around the incision site, drainage of pus or foul smelling drainage.  Inability to urinate or empty your bladder within 8 hours.  Problems with breathing or chest pain.    You should call 911 if you develop problems with breathing or chest pain.  If you are unable to contact your doctor or surgical center, you should go to the nearest emergency room or urgent care center.  Physician's telephone #: [phone]    If any questions arise, call your doctor.  If your doctor is not available, please feel free to call the Surgical Center at [phone].     A registered nurse may call you a few days after your  surgery to see how you are doing after your procedure.    MEDICATIONS: Resume taking daily medication.  Take prescribed pain medication with food.  If no medication is prescribed, you may take non-aspirin pain medication if needed.  PAIN MEDICATION CAN BE VERY CONSTIPATING.  Take a stool softener or laxative such as senokot, pericolace, or milk of magnesia if needed.    Prescription given for Norco (hydrocodone-acetaminophen). You had a narcotic called Oxycodone at 12:00PM.      Last pain medication given at Tylenol and Celebrex (like ibuprofen) given at 9:00 am.    If your physician has prescribed pain medication that includes Acetaminophen (Tylenol), do not take additional Acetaminophen (Tylenol) while taking the prescribed medication.    Depression / Suicide Risk    As you are discharged from this [facility] facility, it is important to learn how to keep safe from harming yourself.    Recognize the warning signs:  · Abrupt changes in personality, positive or negative- including increase in energy   · Giving away possessions  · Change in eating patterns- significant weight changes-  positive or negative  · Change in sleeping patterns- unable to sleep or sleeping all the time   · Unwillingness or inability to communicate  · Depression  · Unusual sadness, discouragement and loneliness  · Talk of wanting to die  · Neglect of personal appearance   · Rebelliousness- reckless behavior  · Withdrawal from people/activities they love  · Confusion- inability to concentrate     If you or a loved one observes any of these behaviors or has concerns about self-harm, here's what you can do:  · Talk about it- your feelings and reasons for harming yourself  · Remove any means that you might use to hurt yourself (examples: pills, rope, extension cords, firearm)  · Get professional help from the community (Mental Health, Substance Abuse, psychological counseling)  · Do not be alone:Call your Safe Contact- someone whom you trust  who will be there for you.  · Call your local CRISIS HOTLINE 141-5616 or 437-495-7205  · Call your local Children's Mobile Crisis Response Team [place] (418) 386-6688 or www.Tryouts  · Call the toll free National Suicide Prevention Hotlines   · National Suicide Prevention Lifeline 265-029-MQBX (5995)  · National [employer] Line Network 800-SUICIDE (849-4048)

## 2022-03-04 NOTE — OP REPORT
DATE OF OPERATION: 3/4/2022     SURGEON: Vinicio Iraheta M.D.    ANESTHESIOLOGIST: Anesthesiologist: Afua Joshi M.D.    ANESTHESIA: General    SURGICAL FIRST ASST: None    PREOPERATIVE DIAGNOSIS: Left fifth metatarsal delayed union    POSTOPERATIVE DIAGNOSIS: Left fifth metatarsal delayed union    PROCEDURE: Open reduction internal fixation of left fifth metatarsal delayed union     EQUIPMENT USED: [employer]    INDICATIONS: 31-year-old female who sustained 5th metatarsal fracture about 4 months ago. She was treated nonoperatively. She had persistent pain and CT scan showed incomplete union with only very small amount of bony bridging. Given the amount of time she had been off her foot and unable to work and and pain she was indicated for fixation of the delayed union to encourage healing. She understood the risk and benefits prior to proceeding.    DETAILS OF PROCEDURE: Patient was met in the preoperative holding area the left side was marked as the correct operative side. Risk-benefit discussion was had and consent was signed. She was brought to the operating room where timeout confirmed patient laterality and procedure. General anesthesia was induced and an airway was established. Tourniquet was placed on the thigh. The leg was prepped and draped in the normal sterile surgical fashion.    We began by making a incision over the fifth metatarsal. We dissected sharply through the skin and then bluntly through the soft tissues and then sharply again once we were safe on the bone. We identified the nonunion site. The fracture had healed with some fibrous tissue and the nonunion site was first inserted with a knife and then opened up with a osteotome. We then used a curette to freshen up the bone surfaces. Once bone surfaces were freshened up the reduction was affected with a point-to-point clamp. We then selected a [employer] metatarsal plate. We positioned this accordingly and clamped it to the bone and  then placed a proximal screw. We then fixated the plate distally. We then placed a lag screw through the plate which gave us nice fracture compression. We then filled the distal holes with locking screws and then placed a second lag screw through the plate. At this point we had good fracture fixation and were happy with our overall stability of the construct. Final x-rays confirmed good position of the plate and good reduction of the fracture. Wound was then irrigated and the fifth metatarsal capsule was closed with 2-0 PDS followed by 3-0 Monocryl and 3-0 nylon for the skin soft dressing was placed and the patient was awoken from anesthesia and brought to the postoperative care unit without complication.     COMPLICATIONS: None    POST OP PLAN: Nonweightbearing for 6 weeks  Follow-up in 2 weeks for suture removal  Aspirin and oxycodone for postop regimen  ____________________________________   Vinicio Iraheta M.D.

## 2022-03-07 NOTE — ANESTHESIA TIME REPORT
Anesthesia Start and Stop Event Times     Date Time Event    3/4/2022 0904 Ready for Procedure     0932 Anesthesia Start     1121 Anesthesia Stop        Responsible Staff  03/04/22    Name Role Begin End    Afua Joshi M.D. Anesth 0932 1121        Preop Diagnosis (Free Text):  Pre-op Diagnosis     CLOSED FRACTURE OF FIFTH METATARSAL BONE OF LEFT FOOT        Preop Diagnosis (Codes):    Premium Reason  Non-Premium    Comments:

## 2022-03-07 NOTE — ANESTHESIA POSTPROCEDURE EVALUATION
Patient: Kristel Dela Cruz    Procedure Summary     Date: 03/04/22 Room / Location: [facility] ROOM 23 / SURGERY SAME DAY [facility]    Anesthesia Start: 0932 Anesthesia Stop: 1121    Procedure: ORIF, FOOT - 5TH METATARSAL (Left Foot) Diagnosis: (CLOSED FRACTURE OF FIFTH METATARSAL BONE OF LEFT FOOT)    Surgeons: Vinicio Iraheta M.D. Responsible Provider: Afua Joshi M.D.    Anesthesia Type: general ASA Status: 2          Final Anesthesia Type: general  Last vitals  BP        Temp        Pulse       Resp        SpO2          Anesthesia Post Evaluation    Patient location during evaluation: PACU  Patient participation: complete - patient participated  Level of consciousness: awake    Airway patency: patent  Anesthetic complications: no  Cardiovascular status: adequate and hemodynamically stable  Respiratory status: acceptable  Hydration status: acceptable    PONV: none          No complications documented.     Nurse Pain Score: 0 (NPRS)

## 2023-06-19 ENCOUNTER — TELEPHONE (OUTPATIENT)
Dept: MEDICAL GROUP | Facility: MEDICAL CENTER | Age: 33
End: 2023-06-19

## 2023-06-19 ENCOUNTER — OFFICE VISIT (OUTPATIENT)
Dept: MEDICAL GROUP | Facility: MEDICAL CENTER | Age: 33
End: 2023-06-19
Payer: COMMERCIAL

## 2023-06-19 VITALS
SYSTOLIC BLOOD PRESSURE: 104 MMHG | OXYGEN SATURATION: 95 % | WEIGHT: 169.75 LBS | HEART RATE: 54 BPM | TEMPERATURE: 97.8 F | HEIGHT: 67 IN | DIASTOLIC BLOOD PRESSURE: 60 MMHG | BODY MASS INDEX: 26.64 KG/M2 | RESPIRATION RATE: 16 BRPM

## 2023-06-19 DIAGNOSIS — J45.30 MILD PERSISTENT ASTHMA WITHOUT COMPLICATION: ICD-10-CM

## 2023-06-19 DIAGNOSIS — N80.9 ENDOMETRIOSIS: ICD-10-CM

## 2023-06-19 DIAGNOSIS — Z91.018 FOOD ALLERGY: ICD-10-CM

## 2023-06-19 DIAGNOSIS — Z11.59 NEED FOR HEPATITIS C SCREENING TEST: ICD-10-CM

## 2023-06-19 DIAGNOSIS — F41.9 ANXIETY: ICD-10-CM

## 2023-06-19 DIAGNOSIS — R53.82 CHRONIC FATIGUE: ICD-10-CM

## 2023-06-19 DIAGNOSIS — Z23 IMMUNIZATION DUE: ICD-10-CM

## 2023-06-19 DIAGNOSIS — Z00.00 ENCOUNTER FOR MEDICAL EXAMINATION TO ESTABLISH CARE: ICD-10-CM

## 2023-06-19 PROCEDURE — 3074F SYST BP LT 130 MM HG: CPT | Performed by: STUDENT IN AN ORGANIZED HEALTH CARE EDUCATION/TRAINING PROGRAM

## 2023-06-19 PROCEDURE — 3078F DIAST BP <80 MM HG: CPT | Performed by: STUDENT IN AN ORGANIZED HEALTH CARE EDUCATION/TRAINING PROGRAM

## 2023-06-19 PROCEDURE — 90471 IMMUNIZATION ADMIN: CPT | Performed by: STUDENT IN AN ORGANIZED HEALTH CARE EDUCATION/TRAINING PROGRAM

## 2023-06-19 PROCEDURE — 90472 IMMUNIZATION ADMIN EACH ADD: CPT | Performed by: STUDENT IN AN ORGANIZED HEALTH CARE EDUCATION/TRAINING PROGRAM

## 2023-06-19 PROCEDURE — 90715 TDAP VACCINE 7 YRS/> IM: CPT | Performed by: STUDENT IN AN ORGANIZED HEALTH CARE EDUCATION/TRAINING PROGRAM

## 2023-06-19 PROCEDURE — 99214 OFFICE O/P EST MOD 30 MIN: CPT | Mod: 25 | Performed by: STUDENT IN AN ORGANIZED HEALTH CARE EDUCATION/TRAINING PROGRAM

## 2023-06-19 PROCEDURE — 90677 PCV20 VACCINE IM: CPT | Performed by: STUDENT IN AN ORGANIZED HEALTH CARE EDUCATION/TRAINING PROGRAM

## 2023-06-19 RX ORDER — IPRATROPIUM BROMIDE AND ALBUTEROL SULFATE 2.5; .5 MG/3ML; MG/3ML
SOLUTION RESPIRATORY (INHALATION)
COMMUNITY

## 2023-06-19 RX ORDER — FLUTICASONE PROPIONATE AND SALMETEROL 250; 50 UG/1; UG/1
1 POWDER RESPIRATORY (INHALATION) EVERY 12 HOURS
Qty: 1 EACH | Refills: 11 | Status: SHIPPED | OUTPATIENT
Start: 2023-06-19

## 2023-06-19 RX ORDER — OXYCODONE HYDROCHLORIDE 5 MG/1
TABLET ORAL
COMMUNITY
End: 2023-06-19

## 2023-06-19 RX ORDER — ALBUTEROL SULFATE 90 UG/1
1-2 AEROSOL, METERED RESPIRATORY (INHALATION) EVERY 6 HOURS PRN
Qty: 18 G | Refills: 11 | Status: SHIPPED | OUTPATIENT
Start: 2023-06-19

## 2023-06-19 RX ORDER — EPINEPHRINE 0.3 MG/.3ML
INJECTION SUBCUTANEOUS
Qty: 1 EACH | Refills: 0 | Status: SHIPPED | OUTPATIENT
Start: 2023-06-19

## 2023-06-19 RX ORDER — FLUCONAZOLE 200 MG/1
TABLET ORAL
COMMUNITY
End: 2023-06-19

## 2023-06-19 RX ORDER — CITALOPRAM HYDROBROMIDE 10 MG/1
10 TABLET ORAL DAILY
Qty: 30 TABLET | Refills: 0 | Status: SHIPPED | OUTPATIENT
Start: 2023-06-19 | End: 2023-07-17

## 2023-06-19 RX ORDER — ALBUTEROL SULFATE 90 UG/1
AEROSOL, METERED RESPIRATORY (INHALATION)
Qty: 8.5 G | Refills: 11 | Status: SHIPPED | OUTPATIENT
Start: 2023-06-19 | End: 2023-06-19 | Stop reason: SDUPTHER

## 2023-06-19 SDOH — ECONOMIC STABILITY: TRANSPORTATION INSECURITY
IN THE PAST 12 MONTHS, HAS THE LACK OF TRANSPORTATION KEPT YOU FROM MEDICAL APPOINTMENTS OR FROM GETTING MEDICATIONS?: NO

## 2023-06-19 SDOH — ECONOMIC STABILITY: HOUSING INSECURITY
IN THE LAST 12 MONTHS, WAS THERE A TIME WHEN YOU DID NOT HAVE A STEADY PLACE TO SLEEP OR SLEPT IN A SHELTER (INCLUDING NOW)?: NO

## 2023-06-19 SDOH — ECONOMIC STABILITY: FOOD INSECURITY: WITHIN THE PAST 12 MONTHS, YOU WORRIED THAT YOUR FOOD WOULD RUN OUT BEFORE YOU GOT MONEY TO BUY MORE.: NEVER TRUE

## 2023-06-19 SDOH — ECONOMIC STABILITY: FOOD INSECURITY: WITHIN THE PAST 12 MONTHS, THE FOOD YOU BOUGHT JUST DIDN'T LAST AND YOU DIDN'T HAVE MONEY TO GET MORE.: NEVER TRUE

## 2023-06-19 SDOH — ECONOMIC STABILITY: INCOME INSECURITY: IN THE LAST 12 MONTHS, WAS THERE A TIME WHEN YOU WERE NOT ABLE TO PAY THE MORTGAGE OR RENT ON TIME?: NO

## 2023-06-19 SDOH — ECONOMIC STABILITY: HOUSING INSECURITY: IN THE LAST 12 MONTHS, HOW MANY PLACES HAVE YOU LIVED?: 1

## 2023-06-19 SDOH — ECONOMIC STABILITY: INCOME INSECURITY: HOW HARD IS IT FOR YOU TO PAY FOR THE VERY BASICS LIKE FOOD, HOUSING, MEDICAL CARE, AND HEATING?: NOT HARD AT ALL

## 2023-06-19 SDOH — HEALTH STABILITY: MENTAL HEALTH
STRESS IS WHEN SOMEONE FEELS TENSE, NERVOUS, ANXIOUS, OR CAN'T SLEEP AT NIGHT BECAUSE THEIR MIND IS TROUBLED. HOW STRESSED ARE YOU?: RATHER MUCH

## 2023-06-19 SDOH — HEALTH STABILITY: PHYSICAL HEALTH: ON AVERAGE, HOW MANY MINUTES DO YOU ENGAGE IN EXERCISE AT THIS LEVEL?: 90 MIN

## 2023-06-19 SDOH — HEALTH STABILITY: PHYSICAL HEALTH: ON AVERAGE, HOW MANY DAYS PER WEEK DO YOU ENGAGE IN MODERATE TO STRENUOUS EXERCISE (LIKE A BRISK WALK)?: 7 DAYS

## 2023-06-19 SDOH — ECONOMIC STABILITY: TRANSPORTATION INSECURITY
IN THE PAST 12 MONTHS, HAS LACK OF TRANSPORTATION KEPT YOU FROM MEETINGS, WORK, OR FROM GETTING THINGS NEEDED FOR DAILY LIVING?: NO

## 2023-06-19 SDOH — ECONOMIC STABILITY: TRANSPORTATION INSECURITY
IN THE PAST 12 MONTHS, HAS LACK OF RELIABLE TRANSPORTATION KEPT YOU FROM MEDICAL APPOINTMENTS, MEETINGS, WORK OR FROM GETTING THINGS NEEDED FOR DAILY LIVING?: NO

## 2023-06-19 ASSESSMENT — SOCIAL DETERMINANTS OF HEALTH (SDOH)
ARE YOU MARRIED, WIDOWED, DIVORCED, SEPARATED, NEVER MARRIED, OR LIVING WITH A PARTNER?: LIVING WITH PARTNER
DO YOU BELONG TO ANY CLUBS OR ORGANIZATIONS SUCH AS CHURCH GROUPS UNIONS, FRATERNAL OR ATHLETIC GROUPS, OR SCHOOL GROUPS?: NO
HOW OFTEN DO YOU ATTENT MEETINGS OF THE CLUB OR ORGANIZATION YOU BELONG TO?: NEVER
HOW OFTEN DO YOU ATTEND CHURCH OR RELIGIOUS SERVICES?: NEVER
HOW OFTEN DO YOU ATTENT MEETINGS OF THE CLUB OR ORGANIZATION YOU BELONG TO?: NEVER
HOW MANY DRINKS CONTAINING ALCOHOL DO YOU HAVE ON A TYPICAL DAY WHEN YOU ARE DRINKING: 1 OR 2
DO YOU BELONG TO ANY CLUBS OR ORGANIZATIONS SUCH AS CHURCH GROUPS UNIONS, FRATERNAL OR ATHLETIC GROUPS, OR SCHOOL GROUPS?: NO
IN A TYPICAL WEEK, HOW MANY TIMES DO YOU TALK ON THE PHONE WITH FAMILY, FRIENDS, OR NEIGHBORS?: ONCE A WEEK
IN A TYPICAL WEEK, HOW MANY TIMES DO YOU TALK ON THE PHONE WITH FAMILY, FRIENDS, OR NEIGHBORS?: ONCE A WEEK
HOW OFTEN DO YOU HAVE SIX OR MORE DRINKS ON ONE OCCASION: NEVER
ARE YOU MARRIED, WIDOWED, DIVORCED, SEPARATED, NEVER MARRIED, OR LIVING WITH A PARTNER?: LIVING WITH PARTNER
HOW OFTEN DO YOU GET TOGETHER WITH FRIENDS OR RELATIVES?: NEVER
WITHIN THE PAST 12 MONTHS, YOU WORRIED THAT YOUR FOOD WOULD RUN OUT BEFORE YOU GOT THE MONEY TO BUY MORE: NEVER TRUE
HOW OFTEN DO YOU ATTEND CHURCH OR RELIGIOUS SERVICES?: NEVER
HOW HARD IS IT FOR YOU TO PAY FOR THE VERY BASICS LIKE FOOD, HOUSING, MEDICAL CARE, AND HEATING?: NOT HARD AT ALL
HOW OFTEN DO YOU HAVE A DRINK CONTAINING ALCOHOL: 2-3 TIMES A WEEK
HOW OFTEN DO YOU GET TOGETHER WITH FRIENDS OR RELATIVES?: NEVER

## 2023-06-19 ASSESSMENT — LIFESTYLE VARIABLES
SKIP TO QUESTIONS 9-10: 1
HOW MANY STANDARD DRINKS CONTAINING ALCOHOL DO YOU HAVE ON A TYPICAL DAY: 1 OR 2
HOW OFTEN DO YOU HAVE SIX OR MORE DRINKS ON ONE OCCASION: NEVER
AUDIT-C TOTAL SCORE: 3
HOW OFTEN DO YOU HAVE A DRINK CONTAINING ALCOHOL: 2-3 TIMES A WEEK

## 2023-06-19 NOTE — PROGRESS NOTES
"Subjective:     CC:  Diagnoses of Encounter for medical examination to establish care, Anxiety, Mild persistent asthma without complication, Chronic fatigue, Endometriosis, Food allergy, Immunization due, and Need for hepatitis C screening test were pertinent to this visit.    HISTORY OF THE PRESENT ILLNESS: Patient is a 33 y.o. female. This pleasant patient is here today to establish care and discuss the following    Problem   Anxiety    This is a chronic condition.  Previously well controlled with Celexa.  She discontinued it a couple months ago but did notice a difference in her anxiety and would like to restart.  She was at 20 mg a day but would like to restart at a lower dose if possible.     Endometriosis    This is a chronic condition, status post laparoscopy, symptoms are well controlled     Chronic Fatigue    This is a chronic condition, she states that she has been under a lot of stress at work which she thinks may be contributing.  She is hoping to get labs to rule out any underlying cause.     Mild Persistent Asthma    This is a chronic, worsening condition.  She has been using her albuterol inhaler multiple times a day recently.  She is hoping for something to help with this.       ROS:   ROS      Objective:     Exam: /60   Pulse (!) 54   Temp 36.6 °C (97.8 °F) (Temporal)   Resp 16   Ht 1.702 m (5' 7\")   Wt 77 kg (169 lb 12.1 oz)   SpO2 95%  Body mass index is 26.59 kg/m².    Physical Exam  Vitals reviewed.   Constitutional:       General: She is not in acute distress.     Appearance: She is not toxic-appearing.   HENT:      Head: Normocephalic and atraumatic.      Right Ear: External ear normal.      Left Ear: External ear normal.   Eyes:      General:         Right eye: No discharge.         Left eye: No discharge.      Extraocular Movements: Extraocular movements intact.      Conjunctiva/sclera: Conjunctivae normal.   Cardiovascular:      Rate and Rhythm: Normal rate and regular rhythm. "      Pulses: Normal pulses.      Heart sounds: Normal heart sounds. No murmur heard.  Pulmonary:      Effort: Pulmonary effort is normal. No respiratory distress.      Breath sounds: Normal breath sounds. No wheezing or rales.   Skin:     General: Skin is warm and dry.   Neurological:      Mental Status: She is alert.   Psychiatric:         Mood and Affect: Mood normal.         Behavior: Behavior normal.         Thought Content: Thought content normal.         Judgment: Judgment normal.         Assessment & Plan:   33 y.o. female with the following -    1. Encounter for medical examination to establish care  History, problem list, medications and allergies reviewed.  Records requested from previous provider if applicable.    2. Anxiety  Chronic, uncontrolled, restart Celexa 10 mg daily.  Discussed the risk and benefits of restarting medication including increased risk of suicidal ideation.  - citalopram (CELEXA) 10 MG tablet; Take 1 Tablet by mouth every day.  Dispense: 30 Tablet; Refill: 0    3. Mild persistent asthma without complication  Chronic, worsening, add Advair  - albuterol 108 (90 Base) MCG/ACT Aero Soln inhalation aerosol; Ventolin HFA 90 mcg/actuation aerosol inhaler  Dispense: 8.5 g; Refill: 11  - fluticasone-salmeterol (ADVAIR) 250-50 MCG/ACT AEROSOL POWDER, BREATH ACTIVATED; Inhale 1 Puff every 12 hours.  Dispense: 1 Each; Refill: 11    4. Chronic fatigue  Chronic, may be related to getting off anxiety medication, labs as below to evaluate  - CORTISOL - AM  - Comp Metabolic Panel; Future  - Lipid Profile; Future  - TSH WITH REFLEX TO FT4; Future  - CBC WITH DIFFERENTIAL; Future    5. Endometriosis  Chronic, well controlled    6. Food allergy  Chronic, EpiPen ordered  - EPINEPHrine (EPIPEN) 0.3 MG/0.3ML Solution Auto-injector solution for injection; Inject 0.3 mL into the thigh one time for 1 dose  Dispense: 1 Each; Refill: 0    7. Immunization due  - Tdap =>8yo IM  - Pneumococcal Conjugate Vaccine  20-Valent (19 yrs+)    8. Need for hepatitis C screening test  - HEP C VIRUS ANTIBODY; Future    No follow-ups on file.    Please note that this dictation was created using voice recognition software. I have made every reasonable attempt to correct obvious errors, but I expect that there are errors of grammar and possibly content that I did not discover before finalizing the note.

## 2023-06-19 NOTE — TELEPHONE ENCOUNTER
----- Message from Carin Werner sent at 6/19/2023 11:45 AM PDT -----  Regarding: ALBUTEROL  Pharmacy called stating the prescription needs directions included in the Sig. Can we update the prescription?      Thank you,  Carin AG[facility]

## 2023-07-15 DIAGNOSIS — F41.9 ANXIETY: ICD-10-CM

## 2023-07-15 NOTE — TELEPHONE ENCOUNTER
Received request via: Pharmacy    Was the patient seen in the last year in this department? Yes    Does the patient have an active prescription (recently filled or refills available) for medication(s) requested?  YES    Does the patient have detention Plus and need 100 day supply (blood pressure, diabetes and cholesterol meds only)? Patient does not have SCP   Requested Prescriptions     Pending Prescriptions Disp Refills    citalopram (CELEXA) 10 MG tablet [Pharmacy Med Name: Citalopram Hydrobromide Oral Tablet 10 MG] 30 Tablet 0     Sig: TAKE ONE TABLET BY MOUTH ONCE DAILY

## 2023-07-17 RX ORDER — CITALOPRAM HYDROBROMIDE 10 MG/1
TABLET ORAL
Qty: 30 TABLET | Refills: 0 | Status: SHIPPED | OUTPATIENT
Start: 2023-07-17 | End: 2023-08-14

## 2023-10-12 ENCOUNTER — PATIENT MESSAGE (OUTPATIENT)
Dept: MEDICAL GROUP | Facility: MEDICAL CENTER | Age: 33
End: 2023-10-12
Payer: COMMERCIAL

## 2023-10-12 DIAGNOSIS — F41.9 ANXIETY: ICD-10-CM

## 2023-10-12 RX ORDER — CITALOPRAM 20 MG/1
20 TABLET ORAL DAILY
Qty: 30 TABLET | Refills: 0 | Status: SHIPPED | OUTPATIENT
Start: 2023-10-12

## 2023-10-24 ENCOUNTER — APPOINTMENT (OUTPATIENT)
Dept: MEDICAL GROUP | Facility: MEDICAL CENTER | Age: 33
End: 2023-10-24
Payer: COMMERCIAL

## 2023-11-15 DIAGNOSIS — F41.9 ANXIETY: ICD-10-CM

## 2023-11-15 RX ORDER — CITALOPRAM 20 MG/1
20 TABLET ORAL DAILY
Qty: 90 TABLET | Refills: 3 | Status: SHIPPED | OUTPATIENT
Start: 2023-11-15

## 2024-02-02 ENCOUNTER — OFFICE VISIT (OUTPATIENT)
Dept: MEDICAL GROUP | Facility: MEDICAL CENTER | Age: 34
End: 2024-02-02
Payer: COMMERCIAL

## 2024-02-02 VITALS
BODY MASS INDEX: 25.74 KG/M2 | HEIGHT: 67 IN | HEART RATE: 80 BPM | SYSTOLIC BLOOD PRESSURE: 114 MMHG | WEIGHT: 164 LBS | TEMPERATURE: 97.3 F | OXYGEN SATURATION: 94 % | DIASTOLIC BLOOD PRESSURE: 62 MMHG

## 2024-02-02 DIAGNOSIS — H65.192 OTHER NON-RECURRENT ACUTE NONSUPPURATIVE OTITIS MEDIA OF LEFT EAR: ICD-10-CM

## 2024-02-02 DIAGNOSIS — J06.9 UPPER RESPIRATORY TRACT INFECTION, UNSPECIFIED TYPE: ICD-10-CM

## 2024-02-02 LAB
FLUAV RNA SPEC QL NAA+PROBE: NEGATIVE
FLUBV RNA SPEC QL NAA+PROBE: NEGATIVE
RSV RNA SPEC QL NAA+PROBE: NEGATIVE
SARS-COV-2 RNA RESP QL NAA+PROBE: NEGATIVE

## 2024-02-02 PROCEDURE — 99214 OFFICE O/P EST MOD 30 MIN: CPT | Performed by: STUDENT IN AN ORGANIZED HEALTH CARE EDUCATION/TRAINING PROGRAM

## 2024-02-02 PROCEDURE — 3078F DIAST BP <80 MM HG: CPT | Performed by: STUDENT IN AN ORGANIZED HEALTH CARE EDUCATION/TRAINING PROGRAM

## 2024-02-02 PROCEDURE — 3074F SYST BP LT 130 MM HG: CPT | Performed by: STUDENT IN AN ORGANIZED HEALTH CARE EDUCATION/TRAINING PROGRAM

## 2024-02-02 PROCEDURE — 0241U POCT CEPHEID COV-2, FLU A/B, RSV - PCR: CPT | Performed by: STUDENT IN AN ORGANIZED HEALTH CARE EDUCATION/TRAINING PROGRAM

## 2024-02-02 RX ORDER — AMOXICILLIN 875 MG/1
875 TABLET, COATED ORAL 2 TIMES DAILY
Qty: 20 TABLET | Refills: 0 | Status: SHIPPED | OUTPATIENT
Start: 2024-02-02

## 2024-02-02 NOTE — PROGRESS NOTES
"Subjective:     CC: General illness, ear pain    HPI:   Kristel presents today with several days of left ear pain.  She was seen by the audiologist at her work who said it looks like she had some inflammation and a perforated eardrum.    Few days ago she began to feel sick as well.  She feels fatigued and has some congestion.  ROS:  ROS    Objective:     Exam:  /62   Pulse 80   Temp 36.3 °C (97.3 °F)   Ht 1.702 m (5' 7\")   Wt 74.4 kg (164 lb)   SpO2 94%   BMI 25.69 kg/m²  Body mass index is 25.69 kg/m².    Physical Exam  Vitals reviewed.   Constitutional:       General: She is not in acute distress.     Appearance: She is not toxic-appearing.   HENT:      Head: Normocephalic and atraumatic.      Right Ear: Tympanic membrane, ear canal and external ear normal.      Left Ear: External ear normal.      Ears:      Comments: Left TM is erythematous and edematous, there is a small hole on the anterior inferior portion  Eyes:      General:         Right eye: No discharge.         Left eye: No discharge.      Extraocular Movements: Extraocular movements intact.      Conjunctiva/sclera: Conjunctivae normal.   Cardiovascular:      Rate and Rhythm: Normal rate and regular rhythm.      Heart sounds: Normal heart sounds. No murmur heard.  Pulmonary:      Effort: Pulmonary effort is normal. No respiratory distress.      Breath sounds: Normal breath sounds. No wheezing or rales.   Skin:     General: Skin is warm and dry.   Neurological:      Mental Status: She is alert.   Psychiatric:         Mood and Affect: Mood normal.         Behavior: Behavior normal.         Thought Content: Thought content normal.         Judgment: Judgment normal.           Assessment & Plan:     33 y.o. female with the following -     1. Upper respiratory tract infection, unspecified type  Check labs below  - POCT CoV-2, Flu A/B, RSV by PCR    2. Other non-recurrent acute nonsuppurative otitis media of left ear  Start amoxicillin for otitis " media.  We discussed to not go underwater or put anything in your ears with a perforated eardrum  - amoxicillin (AMOXIL) 875 MG tablet; Take 1 Tablet by mouth 2 times a day.  Dispense: 20 Tablet; Refill: 0        No follow-ups on file.    Please note that this dictation was created using voice recognition software. I have made every reasonable attempt to correct obvious errors, but I expect that there are errors of grammar and possibly content that I did not discover before finalizing the note.

## 2024-02-06 ENCOUNTER — APPOINTMENT (OUTPATIENT)
Dept: MEDICAL GROUP | Facility: MEDICAL CENTER | Age: 34
End: 2024-02-06
Payer: COMMERCIAL

## 2024-07-15 ENCOUNTER — APPOINTMENT (OUTPATIENT)
Dept: MEDICAL GROUP | Facility: MEDICAL CENTER | Age: 34
End: 2024-07-15
Payer: COMMERCIAL

## 2024-07-16 ENCOUNTER — OFFICE VISIT (OUTPATIENT)
Dept: MEDICAL GROUP | Facility: MEDICAL CENTER | Age: 34
End: 2024-07-16
Payer: COMMERCIAL

## 2024-07-16 VITALS
OXYGEN SATURATION: 98 % | WEIGHT: 165 LBS | HEART RATE: 73 BPM | TEMPERATURE: 97.4 F | DIASTOLIC BLOOD PRESSURE: 74 MMHG | SYSTOLIC BLOOD PRESSURE: 118 MMHG | RESPIRATION RATE: 16 BRPM | BODY MASS INDEX: 25.9 KG/M2 | HEIGHT: 67 IN

## 2024-07-16 DIAGNOSIS — H92.02 EAR PAIN, LEFT: ICD-10-CM

## 2024-07-16 DIAGNOSIS — H72.92: ICD-10-CM

## 2024-07-16 DIAGNOSIS — T78.40XD ALLERGY, SUBSEQUENT ENCOUNTER: ICD-10-CM

## 2024-07-16 DIAGNOSIS — H91.92 CHANGE IN HEARING, LEFT: ICD-10-CM

## 2024-07-16 DIAGNOSIS — J45.31 MILD PERSISTENT ASTHMA WITH ACUTE EXACERBATION: ICD-10-CM

## 2024-07-16 DIAGNOSIS — F41.9 ANXIETY: ICD-10-CM

## 2024-07-16 PROBLEM — T78.40XA ALLERGIES: Status: ACTIVE | Noted: 2024-07-16

## 2024-07-16 PROCEDURE — 99214 OFFICE O/P EST MOD 30 MIN: CPT | Performed by: NURSE PRACTITIONER

## 2024-07-16 PROCEDURE — 3078F DIAST BP <80 MM HG: CPT | Performed by: NURSE PRACTITIONER

## 2024-07-16 PROCEDURE — 3074F SYST BP LT 130 MM HG: CPT | Performed by: NURSE PRACTITIONER

## 2024-07-16 ASSESSMENT — ENCOUNTER SYMPTOMS
PALPITATIONS: 0
CHILLS: 0
FEVER: 0

## 2024-07-27 ENCOUNTER — OFFICE VISIT (OUTPATIENT)
Dept: URGENT CARE | Facility: PHYSICIAN GROUP | Age: 34
End: 2024-07-27
Payer: COMMERCIAL

## 2024-07-27 VITALS
WEIGHT: 165 LBS | HEART RATE: 72 BPM | HEIGHT: 67 IN | BODY MASS INDEX: 25.9 KG/M2 | SYSTOLIC BLOOD PRESSURE: 90 MMHG | TEMPERATURE: 98.1 F | OXYGEN SATURATION: 94 % | DIASTOLIC BLOOD PRESSURE: 60 MMHG | RESPIRATION RATE: 20 BRPM

## 2024-07-27 DIAGNOSIS — R21 RASH: ICD-10-CM

## 2024-07-27 PROCEDURE — 3074F SYST BP LT 130 MM HG: CPT

## 2024-07-27 PROCEDURE — 99213 OFFICE O/P EST LOW 20 MIN: CPT

## 2024-07-27 PROCEDURE — 3078F DIAST BP <80 MM HG: CPT

## 2024-07-27 RX ORDER — AZITHROMYCIN 250 MG/1
TABLET, FILM COATED ORAL
COMMUNITY
End: 2024-07-27

## 2024-07-27 RX ORDER — NITROFURANTOIN 25; 75 MG/1; MG/1
1 CAPSULE ORAL 2 TIMES DAILY
COMMUNITY

## 2024-07-27 RX ORDER — OXYCODONE HYDROCHLORIDE 5 MG/1
TABLET ORAL
COMMUNITY

## 2024-07-27 RX ORDER — FLUCONAZOLE 200 MG/1
TABLET ORAL
COMMUNITY

## 2024-07-27 RX ORDER — TRAMADOL HYDROCHLORIDE 50 MG/1
TABLET ORAL
COMMUNITY

## 2024-07-27 RX ORDER — DOXYCYCLINE HYCLATE 100 MG
100 TABLET ORAL 2 TIMES DAILY
Qty: 20 TABLET | Refills: 0 | Status: SHIPPED | OUTPATIENT
Start: 2024-07-27 | End: 2024-08-06

## 2024-07-27 RX ORDER — HYDROCODONE BITARTRATE AND ACETAMINOPHEN 5; 325 MG/1; MG/1
TABLET ORAL
COMMUNITY

## 2024-07-27 RX ORDER — PHENAZOPYRIDINE HYDROCHLORIDE 200 MG/1
1 TABLET, FILM COATED ORAL 3 TIMES DAILY
COMMUNITY

## 2024-12-06 ENCOUNTER — OFFICE VISIT (OUTPATIENT)
Dept: MEDICAL GROUP | Facility: MEDICAL CENTER | Age: 34
End: 2024-12-06
Payer: COMMERCIAL

## 2024-12-06 VITALS
DIASTOLIC BLOOD PRESSURE: 54 MMHG | SYSTOLIC BLOOD PRESSURE: 108 MMHG | BODY MASS INDEX: 26.02 KG/M2 | TEMPERATURE: 98.5 F | WEIGHT: 165.8 LBS | HEART RATE: 65 BPM | OXYGEN SATURATION: 95 % | HEIGHT: 67 IN

## 2024-12-06 DIAGNOSIS — Z11.59 NEED FOR HEPATITIS C SCREENING TEST: ICD-10-CM

## 2024-12-06 DIAGNOSIS — Z31.69 INFERTILITY COUNSELING: ICD-10-CM

## 2024-12-06 DIAGNOSIS — N80.9 ENDOMETRIOSIS: ICD-10-CM

## 2024-12-06 DIAGNOSIS — Z00.00 ANNUAL PHYSICAL EXAM: ICD-10-CM

## 2024-12-06 DIAGNOSIS — J45.31 MILD PERSISTENT ASTHMA WITH ACUTE EXACERBATION: ICD-10-CM

## 2024-12-06 DIAGNOSIS — F41.9 ANXIETY: ICD-10-CM

## 2024-12-06 DIAGNOSIS — Z11.4 SCREENING FOR HIV (HUMAN IMMUNODEFICIENCY VIRUS): ICD-10-CM

## 2024-12-06 PROCEDURE — 99395 PREV VISIT EST AGE 18-39: CPT | Performed by: NURSE PRACTITIONER

## 2024-12-06 PROCEDURE — 3078F DIAST BP <80 MM HG: CPT | Performed by: NURSE PRACTITIONER

## 2024-12-06 PROCEDURE — 99214 OFFICE O/P EST MOD 30 MIN: CPT | Mod: 25 | Performed by: NURSE PRACTITIONER

## 2024-12-06 PROCEDURE — 3074F SYST BP LT 130 MM HG: CPT | Performed by: NURSE PRACTITIONER

## 2024-12-06 RX ORDER — CITALOPRAM HYDROBROMIDE 20 MG/1
20 TABLET ORAL DAILY
Qty: 90 TABLET | Refills: 1 | Status: SHIPPED | OUTPATIENT
Start: 2024-12-06

## 2024-12-06 ASSESSMENT — ENCOUNTER SYMPTOMS
SPEECH CHANGE: 0
SINUS PAIN: 0
BRUISES/BLEEDS EASILY: 0
SENSORY CHANGE: 0
EYE PAIN: 0
BACK PAIN: 0
VOMITING: 0
SORE THROAT: 0
DIZZINESS: 0
SHORTNESS OF BREATH: 0
NERVOUS/ANXIOUS: 0
EYE DISCHARGE: 0
COUGH: 0
WEAKNESS: 0
LOSS OF CONSCIOUSNESS: 0
FOCAL WEAKNESS: 0
FLANK PAIN: 0
MYALGIAS: 0
EYE REDNESS: 0
BLOOD IN STOOL: 0
ABDOMINAL PAIN: 0
CHILLS: 0
POLYDIPSIA: 0
DEPRESSION: 0
TREMORS: 0
NAUSEA: 0
WHEEZING: 0
FEVER: 0
PALPITATIONS: 0
WEIGHT LOSS: 0
SPUTUM PRODUCTION: 0
INSOMNIA: 0
DIARRHEA: 0
HEADACHES: 0
CONSTIPATION: 0

## 2024-12-06 ASSESSMENT — PATIENT HEALTH QUESTIONNAIRE - PHQ9: CLINICAL INTERPRETATION OF PHQ2 SCORE: 0

## 2024-12-06 NOTE — PROGRESS NOTES
Patient agreed to using YANETH: yes    Kristel was seen today for referral needed and infertility.    Diagnoses and all orders for this visit:    Infertility counseling  -     Referral to OB/Gyn  -     Referral to Infertility    Endometriosis  -     Referral to OB/Gyn  -     Referral to Infertility    Anxiety  -     citalopram (CELEXA) 20 MG Tab; Take 1 Tablet by mouth every day.    Annual physical exam  -     CBC WITHOUT DIFFERENTIAL; Future  -     Comp Metabolic Panel; Future  -     Lipid Profile; Future  -     TSH WITH REFLEX TO FT4; Future  -     VITAMIN D,25 HYDROXY (DEFICIENCY); Future  -     PROGESTERONE; Future  -     FSH/LH; Future  -     ESTRADIOL; Future  -     DHEA SULFATE; Future  -     PROLACTIN; Future  -     ESTRONE; Future  -     TESTOSTERONE F&T FEMALES/CHILD; Future    Screening for HIV (human immunodeficiency virus)  -     HIV AG/AB COMBO ASSAY SCREENING; Future    Need for hepatitis C screening test  -     HEP C VIRUS ANTIBODY; Future              Assessment & Plan  1. Infertility.  She has been trying to conceive for over a year without success. She has a history of endometriosis and underwent surgery to clear it and flush her fallopian tubes. A referral to an OB/GYN specialist, will be made. Additionally, a consultation with a fertility specialist is recommended. Blood work, including thyroid and hormone levels (progesterone, FSH, LH, estradiol, AGA, and testosterone), will be ordered. Tests for HIV and hepatitis C will also be conducted. The patient is advised to fast for 8 hours before the blood work.    2. Asthma.  Stable condition.  She reports that her asthma is well-controlled with a daily inhaler and a rescue inhaler as needed. She does not experience chest tightness, wheezing, or cough. No refill is needed at this time.    3.  Anxiety  Chronic and stable condition.  A prescription for citalopram 20 mg will be refilled for a duration of 90 days. She reports doing well on the current  dosage.    4.  Annual physical exam.      History of Present Illness  The patient presents for evaluation of multiple medical concerns.    She is seeking a referral to an OB/GYN due to difficulties in conceiving. She has a history of endometriosis, for which she underwent surgery. During the procedure, her fallopian tubes were flushed as one was found to be enclosed. Despite being informed that everything was normal post-surgery, she has been unable to conceive for over a year. Her menstrual cycles are regular and normal. She has not had her annual exam with Dr. Scott, and her last Pap smear was conducted 3 years ago, which showed no abnormalities. She has no concerns regarding sexually transmitted infections.    She has asthma and uses a daily inhaler for management. She also has a rescue inhaler for emergencies. She reports no symptoms of chest tightness, wheezing, or cough.    She is currently on citalopram 20 mg for depression and is responding well to the treatment.    SOCIAL HISTORY  She denies any substances.       Ob-Gyn/ History:    Last Pap Smear:  . no history of abnormal pap smears.    Health Maintenance  Below Anticipatory guidance discussed with patient  Cholesterol Screening: labs  Diabetes Screening: labs  Aspirin Use: no    Diet: regular   Exercise: active   Substance Abuse: no   Safe in relationship.   Seat belts, bike helmet, gun safety discussed.  Sun protection used.    Cancer screening  Cervical Cancer Screenin    Infectious disease screening/Immunizations  --STI Screening: no concerns    --Practices safe sex.  --HIV Screening: labs   --Hepatitis C Screening: labs   --Immunizations: Flu decleined     Review of Systems   Constitutional:  Negative for chills, fever, malaise/fatigue and weight loss.   HENT:  Negative for congestion, ear discharge, hearing loss, sinus pain and sore throat.    Eyes:  Negative for pain, discharge and redness.   Respiratory:  Negative for cough, sputum  production, shortness of breath and wheezing.    Cardiovascular:  Negative for chest pain, palpitations and leg swelling.   Gastrointestinal:  Negative for abdominal pain, blood in stool, constipation, diarrhea, nausea and vomiting.   Genitourinary:  Negative for dysuria, flank pain, frequency, hematuria and urgency.   Musculoskeletal:  Negative for back pain, joint pain and myalgias.   Skin:  Negative for itching and rash.   Neurological:  Negative for dizziness, tremors, sensory change, speech change, focal weakness, loss of consciousness, weakness and headaches.   Endo/Heme/Allergies:  Negative for environmental allergies and polydipsia. Does not bruise/bleed easily.   Psychiatric/Behavioral:  Negative for depression. The patient is not nervous/anxious and does not have insomnia.         He  has a past medical history of Asthma (2022), Pain (2022), and Pneumonia (2022).  He  has a past surgical history that includes gyn surgery () and orif, foot (Left, 3/4/2022).  Family History   Problem Relation Age of Onset    Diabetes Other     Ovarian Cancer Neg Hx     Tubal Cancer Neg Hx     Peritoneal Cancer Neg Hx     Breast Cancer Neg Hx     Colorectal Cancer Neg Hx      Social History     Tobacco Use    Smoking status: Former     Current packs/day: 0.00     Types: Cigarettes     Quit date: 2015     Years since quittin.9    Smokeless tobacco: Never   Vaping Use    Vaping status: Former   Substance Use Topics    Alcohol use: Yes     Comment: a few times a week    Drug use: Not Currently     Patient Active Problem List    Diagnosis Date Noted    Ear pain, left 2024    Allergies 2024    Change in hearing, left 2024    Hole in the ear drum, left 2024    Anxiety 2023    Endometriosis 2023    Chronic fatigue 2023    Mild persistent asthma 2022    Closed fracture of fifth metatarsal bone 2021     Current Outpatient Medications   Medication Sig  "Dispense Refill    citalopram (CELEXA) 20 MG Tab Take 1 Tablet by mouth every day. 90 Tablet 1    ipratropium-albuterol (DUONEB) 0.5-2.5 (3) MG/3ML nebulizer solution ipratropium 0.5 mg-albuterol 3 mg (2.5 mg base)/3 mL nebulization soln   USE 3 ML VIA NEBULIZER EVERY 4 HOURS FOR UP TO 7 DAYS AS NEEDED FOR SHORTNESS OF BREATH      albuterol 108 (90 Base) MCG/ACT Aero Soln inhalation aerosol Inhale 1-2 Puffs every 6 hours as needed for Shortness of Breath. 18 g 11    fluconazole (DIFLUCAN) 200 MG Tab  (Patient not taking: Reported on 7/27/2024)      HYDROcodone-acetaminophen (NORCO) 5-325 MG Tab per tablet Take 1 tablet every 6 hours by oral route for 10 days. (Patient not taking: Reported on 7/27/2024)      nitrofurantoin (MACROBID) 100 MG Cap Take 1 Capsule by mouth 2 times a day. (Patient not taking: Reported on 7/27/2024)      oxyCODONE immediate-release (ROXICODONE) 5 MG Tab Take 1 tablet every 4 hours by oral route as needed for 7 days. (Patient not taking: Reported on 7/27/2024)      phenazopyridine (PYRIDIUM) 200 MG Tab Take 1 Tablet by mouth 3 times a day. (Patient not taking: Reported on 7/27/2024)      traMADol (ULTRAM) 50 MG Tab TAKE ONE TABLET BY MOUTH EVERY SIX HOURS (Patient not taking: Reported on 7/27/2024)      EPINEPHrine (EPIPEN) 0.3 MG/0.3ML Solution Auto-injector solution for injection Inject 0.3 mL into the thigh one time for 1 dose (Patient not taking: Reported on 7/27/2024) 1 Each 0    montelukast (SINGULAIR) 10 MG Tab Take  by mouth every day.       No current facility-administered medications for this visit.    (including changes today)  Allergies: Other food and [place]    /54 (BP Location: Left arm, Patient Position: Sitting, BP Cuff Size: Adult)   Pulse 65   Temp 36.9 °C (98.5 °F) (Temporal)   Ht 1.702 m (5' 7\")   Wt 75.2 kg (165 lb 12.8 oz)   SpO2 95%      Physical Exam  Constitutional:       General: She is not in acute distress.     Appearance: Normal appearance. She is " normal weight. She is not ill-appearing.   HENT:      Head: Normocephalic and atraumatic.      Right Ear: Tympanic membrane, ear canal and external ear normal. There is no impacted cerumen.      Left Ear: Tympanic membrane, ear canal and external ear normal. There is no impacted cerumen.      Nose: Nose normal. No congestion or rhinorrhea.      Mouth/Throat:      Mouth: Mucous membranes are moist.      Pharynx: No oropharyngeal exudate or posterior oropharyngeal erythema.   Eyes:      General:         Right eye: No discharge.         Left eye: No discharge.      Pupils: Pupils are equal, round, and reactive to light.   Cardiovascular:      Rate and Rhythm: Normal rate.      Pulses: Normal pulses.      Heart sounds: Normal heart sounds. No murmur heard.     No friction rub. No gallop.   Pulmonary:      Effort: Pulmonary effort is normal. No respiratory distress.      Breath sounds: Normal breath sounds. No wheezing, rhonchi or rales.   Abdominal:      General: Bowel sounds are normal. There is no distension.      Palpations: Abdomen is soft. There is no mass.      Tenderness: There is no abdominal tenderness. There is no right CVA tenderness, left CVA tenderness, guarding or rebound.      Hernia: No hernia is present.   Musculoskeletal:         General: No swelling, tenderness or deformity. Normal range of motion.      Cervical back: Normal range of motion and neck supple.      Right lower leg: No edema.      Left lower leg: No edema.   Lymphadenopathy:      Cervical: No cervical adenopathy.   Skin:     General: Skin is warm.      Findings: No rash.   Neurological:      General: No focal deficit present.      Mental Status: She is alert. Mental status is at baseline.      Cranial Nerves: No cranial nerve deficit.      Sensory: No sensory deficit.      Motor: No weakness.      Coordination: Coordination normal.      Gait: Gait normal.   Psychiatric:         Mood and Affect: Mood normal.         Behavior: Behavior  normal.          Results         No follow-ups on file. F/u with PCP

## 2024-12-13 ENCOUNTER — HOSPITAL ENCOUNTER (OUTPATIENT)
Dept: LAB | Facility: MEDICAL CENTER | Age: 34
End: 2024-12-13
Attending: NURSE PRACTITIONER
Payer: COMMERCIAL

## 2024-12-13 DIAGNOSIS — Z11.59 NEED FOR HEPATITIS C SCREENING TEST: ICD-10-CM

## 2024-12-13 DIAGNOSIS — Z00.00 ANNUAL PHYSICAL EXAM: ICD-10-CM

## 2024-12-13 DIAGNOSIS — Z11.4 SCREENING FOR HIV (HUMAN IMMUNODEFICIENCY VIRUS): ICD-10-CM

## 2024-12-13 LAB
ERYTHROCYTE [DISTWIDTH] IN BLOOD BY AUTOMATED COUNT: 41.1 FL (ref 35.9–50)
HCT VFR BLD AUTO: 44 % (ref 37–47)
HCV AB SER QL: NORMAL
HGB BLD-MCNC: 14.7 G/DL (ref 12–16)
HIV 1+2 AB+HIV1 P24 AG SERPL QL IA: NORMAL
MCH RBC QN AUTO: 29.9 PG (ref 27–33)
MCHC RBC AUTO-ENTMCNC: 33.4 G/DL (ref 32.2–35.5)
MCV RBC AUTO: 89.4 FL (ref 81.4–97.8)
PLATELET # BLD AUTO: 291 K/UL (ref 164–446)
PMV BLD AUTO: 10.4 FL (ref 9–12.9)
RBC # BLD AUTO: 4.92 M/UL (ref 4.2–5.4)
WBC # BLD AUTO: 5 K/UL (ref 4.8–10.8)

## 2024-12-13 PROCEDURE — 83002 ASSAY OF GONADOTROPIN (LH): CPT

## 2024-12-13 PROCEDURE — 84144 ASSAY OF PROGESTERONE: CPT

## 2024-12-13 PROCEDURE — 84270 ASSAY OF SEX HORMONE GLOBUL: CPT

## 2024-12-13 PROCEDURE — 86803 HEPATITIS C AB TEST: CPT

## 2024-12-13 PROCEDURE — 84146 ASSAY OF PROLACTIN: CPT

## 2024-12-13 PROCEDURE — 85027 COMPLETE CBC AUTOMATED: CPT

## 2024-12-13 PROCEDURE — 36415 COLL VENOUS BLD VENIPUNCTURE: CPT

## 2024-12-13 PROCEDURE — 82627 DEHYDROEPIANDROSTERONE: CPT

## 2024-12-13 PROCEDURE — 82670 ASSAY OF TOTAL ESTRADIOL: CPT

## 2024-12-13 PROCEDURE — 80061 LIPID PANEL: CPT

## 2024-12-13 PROCEDURE — 84403 ASSAY OF TOTAL TESTOSTERONE: CPT

## 2024-12-13 PROCEDURE — 80053 COMPREHEN METABOLIC PANEL: CPT

## 2024-12-13 PROCEDURE — 84443 ASSAY THYROID STIM HORMONE: CPT

## 2024-12-13 PROCEDURE — 83001 ASSAY OF GONADOTROPIN (FSH): CPT

## 2024-12-13 PROCEDURE — 82306 VITAMIN D 25 HYDROXY: CPT

## 2024-12-13 PROCEDURE — 82679 ASSAY OF ESTRONE: CPT

## 2024-12-13 PROCEDURE — 87389 HIV-1 AG W/HIV-1&-2 AB AG IA: CPT

## 2024-12-13 PROCEDURE — 84402 ASSAY OF FREE TESTOSTERONE: CPT

## 2024-12-14 LAB
25(OH)D3 SERPL-MCNC: 43 NG/ML (ref 30–100)
ALBUMIN SERPL BCP-MCNC: 4.5 G/DL (ref 3.2–4.9)
ALBUMIN/GLOB SERPL: 1.6 G/DL
ALP SERPL-CCNC: 61 U/L (ref 30–99)
ALT SERPL-CCNC: 31 U/L (ref 2–50)
ANION GAP SERPL CALC-SCNC: 11 MMOL/L (ref 7–16)
AST SERPL-CCNC: 23 U/L (ref 12–45)
BILIRUB SERPL-MCNC: 0.4 MG/DL (ref 0.1–1.5)
BUN SERPL-MCNC: 20 MG/DL (ref 8–22)
CALCIUM ALBUM COR SERPL-MCNC: 8.6 MG/DL (ref 8.5–10.5)
CALCIUM SERPL-MCNC: 9 MG/DL (ref 8.5–10.5)
CHLORIDE SERPL-SCNC: 103 MMOL/L (ref 96–112)
CHOLEST SERPL-MCNC: 177 MG/DL (ref 100–199)
CO2 SERPL-SCNC: 24 MMOL/L (ref 20–33)
CREAT SERPL-MCNC: 0.56 MG/DL (ref 0.5–1.4)
DHEA-S SERPL-MCNC: 164 UG/DL (ref 60.9–337)
ESTRADIOL SERPL-MCNC: 57.2 PG/ML
FSH SERPL-ACNC: 8.5 MIU/ML
GFR SERPLBLD CREATININE-BSD FMLA CKD-EPI: 121 ML/MIN/1.73 M 2
GLOBULIN SER CALC-MCNC: 2.8 G/DL (ref 1.9–3.5)
GLUCOSE SERPL-MCNC: 80 MG/DL (ref 65–99)
HDLC SERPL-MCNC: 83 MG/DL
LDLC SERPL CALC-MCNC: 86 MG/DL
LH SERPL-ACNC: 3.7 IU/L
POTASSIUM SERPL-SCNC: 4.4 MMOL/L (ref 3.6–5.5)
PROGEST SERPL-MCNC: <0.05 NG/ML
PROLACTIN SERPL-MCNC: 8.76 NG/ML (ref 2.8–26)
PROT SERPL-MCNC: 7.3 G/DL (ref 6–8.2)
SODIUM SERPL-SCNC: 138 MMOL/L (ref 135–145)
TRIGL SERPL-MCNC: 38 MG/DL (ref 0–149)
TSH SERPL DL<=0.005 MIU/L-ACNC: 1.44 UIU/ML (ref 0.38–5.33)

## 2024-12-17 LAB — ESTRONE SERPL-MCNC: 23.6 PG/ML

## 2024-12-18 LAB
SHBG SERPL-SCNC: 53 NMOL/L (ref 25–122)
TESTOST FREE SERPL-MCNC: 1.5 PG/ML (ref 1.3–9.2)
TESTOST SERPL-MCNC: 12 NG/DL (ref 9–55)

## 2025-02-20 ENCOUNTER — HOSPITAL ENCOUNTER (OUTPATIENT)
Facility: MEDICAL CENTER | Age: 35
End: 2025-02-20
Attending: OBSTETRICS & GYNECOLOGY
Payer: COMMERCIAL

## 2025-02-20 LAB
RUBV AB SER QL: 144 IU/ML
TSH SERPL-ACNC: 1.52 UIU/ML (ref 0.35–5.5)

## 2025-02-20 PROCEDURE — 84443 ASSAY THYROID STIM HORMONE: CPT

## 2025-02-20 PROCEDURE — 82166 ASSAY ANTI-MULLERIAN HORM: CPT

## 2025-02-20 PROCEDURE — 86762 RUBELLA ANTIBODY: CPT

## 2025-02-21 ENCOUNTER — HOSPITAL ENCOUNTER (OUTPATIENT)
Facility: MEDICAL CENTER | Age: 35
End: 2025-02-21
Attending: STUDENT IN AN ORGANIZED HEALTH CARE EDUCATION/TRAINING PROGRAM
Payer: COMMERCIAL

## 2025-02-21 ENCOUNTER — GYNECOLOGY VISIT (OUTPATIENT)
Dept: GYNECOLOGY | Facility: CLINIC | Age: 35
End: 2025-02-21
Payer: COMMERCIAL

## 2025-02-21 VITALS
HEART RATE: 72 BPM | HEIGHT: 66 IN | BODY MASS INDEX: 27.64 KG/M2 | DIASTOLIC BLOOD PRESSURE: 68 MMHG | WEIGHT: 172 LBS | SYSTOLIC BLOOD PRESSURE: 108 MMHG

## 2025-02-21 DIAGNOSIS — Z31.69 INFERTILITY COUNSELING: ICD-10-CM

## 2025-02-21 DIAGNOSIS — N80.9 ENDOMETRIOSIS: ICD-10-CM

## 2025-02-21 DIAGNOSIS — Z12.4 CERVICAL CANCER SCREENING: ICD-10-CM

## 2025-02-21 PROCEDURE — 87591 N.GONORRHOEAE DNA AMP PROB: CPT

## 2025-02-21 PROCEDURE — 88142 CYTOPATH C/V THIN LAYER: CPT

## 2025-02-21 PROCEDURE — 87624 HPV HI-RISK TYP POOLED RSLT: CPT

## 2025-02-21 PROCEDURE — 87491 CHLMYD TRACH DNA AMP PROBE: CPT

## 2025-02-21 ASSESSMENT — FIBROSIS 4 INDEX: FIB4 SCORE: 0.5

## 2025-02-21 NOTE — PROGRESS NOTES
Minimally Invasive Gynecologic Surgery Consult       CC: endometriosis, infertility    HPI  Kristel Dela Cruz is a 34 y.o. female  presenting today for the above.  Patient   Patient with confirmed endometriosis and the following symptoms:  - Dysmenorrhea: yes, partially relieved with OTC. No significant pelvic pain outside of periods.  - Dyspareunia: no  - Dyschezia: no  - Bowel symptoms: no  - Bladder symptoms: no  - Infertility: yes, TTC for one year. Just started evaluation by Dr Caballero. Has not done imaging or labs yet.     Previous treatment:  - Medical: tylenol and advil, partially help  - Surgical:   > 2018: LSC ablation endo at [facility]? - did not help with pain    Desires future fertility: yes    Irregular periods every 16-19 days, it lasts 6 days.       Imaging  none    Menstrual History  Patient's last menstrual period was 2025 (exact date).  Regular, monthly, normal flow       Gynecologic History  Last pap: cannot recall  Hx abnormal pap smears: no  Hx of PID/STDs: no  Contraception plan: no      OB History    Para Term  AB Living   0 0 0 0 0 0   SAB IAB Ectopic Molar Multiple Live Births   0 0 0 0 0 0       Past Medical History  Past Medical History:   Diagnosis Date    Asthma 2022    medicated    Depression     Migraine     Pain 2022    left foot pain r/t break    Pneumonia 2022       Past Surgical History  Past Surgical History:   Procedure Laterality Date    ORIF, FOOT Left 3/4/2022    Procedure: ORIF, FOOT - 5TH METATARSAL;  Surgeon: Vinicio Iraheta M.D.;  Location: SURGERY SAME DAY [facility];  Service: Orthopedics    GYN SURGERY  2018    Laparoscopy       Social History  Social History     Tobacco Use    Smoking status: Former     Current packs/day: 0.00     Types: Cigarettes     Quit date: 2015     Years since quitting: 10.1    Smokeless tobacco: Never   Vaping Use    Vaping status: Former   Substance Use Topics    Alcohol use: Yes     Comment: mahesh  few times a week    Drug use: Not Currently        Family History  Family History   Problem Relation Age of Onset    No Known Problems Mother     No Known Problems Father     No Known Problems Sister     No Known Problems Sister     No Known Problems Brother     No Known Problems Brother     No Known Problems Maternal Grandmother     No Known Problems Maternal Grandfather     No Known Problems Paternal Grandmother     No Known Problems Paternal Grandfather     Diabetes Other     Ovarian Cancer Neg Hx     Tubal Cancer Neg Hx     Peritoneal Cancer Neg Hx     Breast Cancer Neg Hx     Colorectal Cancer Neg Hx        Home Medications  Current Outpatient Medications   Medication Sig    citalopram (CELEXA) 20 MG Tab Take 1 Tablet by mouth every day.    EPINEPHrine (EPIPEN) 0.3 MG/0.3ML Solution Auto-injector solution for injection Inject 0.3 mL into the thigh one time for 1 dose    albuterol 108 (90 Base) MCG/ACT Aero Soln inhalation aerosol Inhale 1-2 Puffs every 6 hours as needed for Shortness of Breath.    fluconazole (DIFLUCAN) 200 MG Tab  (Patient not taking: Reported on 7/27/2024)    HYDROcodone-acetaminophen (NORCO) 5-325 MG Tab per tablet Take 1 tablet every 6 hours by oral route for 10 days. (Patient not taking: Reported on 7/27/2024)    nitrofurantoin (MACROBID) 100 MG Cap Take 1 Capsule by mouth 2 times a day. (Patient not taking: Reported on 7/27/2024)    oxyCODONE immediate-release (ROXICODONE) 5 MG Tab Take 1 tablet every 4 hours by oral route as needed for 7 days. (Patient not taking: Reported on 7/27/2024)    phenazopyridine (PYRIDIUM) 200 MG Tab Take 1 Tablet by mouth 3 times a day. (Patient not taking: Reported on 7/27/2024)    traMADol (ULTRAM) 50 MG Tab TAKE ONE TABLET BY MOUTH EVERY SIX HOURS (Patient not taking: Reported on 7/27/2024)    ipratropium-albuterol (DUONEB) 0.5-2.5 (3) MG/3ML nebulizer solution ipratropium 0.5 mg-albuterol 3 mg (2.5 mg base)/3 mL nebulization soln   USE 3 ML VIA NEBULIZER  "EVERY 4 HOURS FOR UP TO 7 DAYS AS NEEDED FOR SHORTNESS OF BREATH (Patient not taking: Reported on 2/21/2025)    montelukast (SINGULAIR) 10 MG Tab Take  by mouth every day.       Allergies/Reactions  Allergies   Allergen Reactions    Other Food Anaphylaxis     Walnuts, and doesn't eat other nuts    [place] Anaphylaxis          ROS: I have reviewed all systems with patient. Pertinent positive and negative findings are listed below except for what is otherwise stated in the History of Present Illness.       Objective:    Labs  Lab Results   Component Value Date/Time    WBC 5.0 12/13/2024 07:39 AM    RBC 4.92 12/13/2024 07:39 AM    HEMOGLOBIN 14.7 12/13/2024 07:39 AM    HEMATOCRIT 44.0 12/13/2024 07:39 AM    MCV 89.4 12/13/2024 07:39 AM    MCH 29.9 12/13/2024 07:39 AM    MCHC 33.4 12/13/2024 07:39 AM    RDW 41.1 12/13/2024 07:39 AM    PLATELETCT 291 12/13/2024 07:39 AM    MPV 10.4 12/13/2024 07:39 AM    NEUTSPOLYS 74 03/04/2019 08:45 AM    LYMPHOCYTES 17 (L) 03/04/2019 08:45 AM    MONOCYTES 5 03/04/2019 08:45 AM    EOSINOPHILS 4 03/04/2019 08:45 AM    BASOPHILS 1 03/04/2019 08:45 AM    NEUTS 7.02 (H) 03/04/2019 08:45 AM    LYMPHS 1.63 03/04/2019 08:45 AM    MONOS 0.48 03/04/2019 08:45 AM    EOS 0.33 03/04/2019 08:45 AM    BASO 0.05 03/04/2019 08:45 AM       No results found for: \"HBA1C\"      Physical Exam  /68 (BP Location: Left arm, Patient Position: Sitting, BP Cuff Size: Adult)   Pulse 72   Ht 5' 6\"   Wt 172 lb   LMP 02/18/2025 (Exact Date)   BMI 27.76 kg/m²    Patient's last menstrual period was 02/18/2025 (exact date).  Body mass index is 27.76 kg/m².    General: alert, well appearing, and in no distress  Neurological: alert, oriented, normal speech, no focal findings or movement disorder noted  Respiratory: no tachypnea, retractions or cyanosis  Abdominal: soft, nontender, nondistended, no masses or organomegaly,    Pelvic exam:   external genitalia: normal appearance  urinary system: urethral meatus " normal  vaginal: normal mucosa without prolapse or lesions  pelvic floor: nontender  cervix: normal appearance  adnexa: normal on bimanual exam  uterus: normal size , normal contour , mobile, non-tender    Female chaperone present - see MA note         Assessment:  35yo G0  Hx endoemtriosis s/p LSC ablation  TTC x 1y  Started evaluation with Dr Caballero  No images      Plan:  1. Endometriosis  Discussed with the patient the diagnosis of endometriosis: the familial inheritance, progressive nature of the disease, possible associated infertility, diagnosis by laparoscopy and limitations of imaging. Reviewed with patient that endometriosis should be viewed as a chronic disease that requires a lifelong management plan with the goal of maximizing the use of medical treatment and avoiding repeated surgical procedures. Reviewed typical treatment options including medical management (OCPs, POPs, Lupron, [place]issa) and surgical options (excision of lesions, hysterectomy). The first line therapy for endometriosis is hormonal suppression and approximately 70% of women experience improvement in their pain with hormonal suppression. Reviewed that medical interventions do not improve fertility, diminish endometriomas, or treat complications of deep endometriosis such as ureteral obstruction. Surgical therapy is usually only considered when hormonal suppression is not effective, when it is not an option, or when there is a persistent pelvic mass suggestive of a large ovarian endometriotic cyst.     Surgical therapy with excision of endometriosis provides pain improvement for approximately 70% of women, but the risk of recurrent pain approaches 50% 2-4 years after surgery. Repetitive surgeries tend to be less successful than the initial surgery for the treatment of pain. Finally, we also discussed that even when endometriosis is identified in women with pelvic pain, it is often not the only cause of pain and other possible sources of  pain should be identified and treated.     Patient without significant sx currently. No recent pelvic US. Reviewed with her that there is no indication for surgery currently, unless abnormal findings present on pelvic imaging. She should continue her workup with Dr Caballero, if evidence or endometrioma, hydrosalpinx, polyp etc - I will be available for surgery.    2. Infertility counseling  Workup per Dr Caballero    3. Cervical cancer screening  - THINPREP PAP W/HPV AND CTNG; Future         Cherri Horton MD

## 2025-02-21 NOTE — PROGRESS NOTES
New patient is here to establish for her endometriosis.  LMP: 02.18.25; Irreg. cycles and heavy flow. Pt reports she is currently on her period.  BCM: None, pt is currently trying to conceive. PCP has sent her a referral for infertility counseling.  Pap: Pt is unsure of last pap, would like one done today if possible. Per pt no hx of abn paps.  Mammo: Never  Pt states she has been experiencing extreme lower back pain and abd cramping with fatigue.   Patient phone number is [phone]  Pharmacy has been verified.   Per pt she is ok with med student in room/exam.

## 2025-02-24 ENCOUNTER — TELEPHONE (OUTPATIENT)
Dept: OBGYN | Facility: CLINIC | Age: 35
End: 2025-02-24
Payer: COMMERCIAL

## 2025-02-24 DIAGNOSIS — Z12.4 CERVICAL CANCER SCREENING: ICD-10-CM

## 2025-02-24 LAB
C TRACH DNA GENITAL QL NAA+PROBE: NEGATIVE
N GONORRHOEA DNA GENITAL QL NAA+PROBE: NEGATIVE
SPECIMEN SOURCE: NORMAL

## 2025-02-24 NOTE — TELEPHONE ENCOUNTER
Buffy SMITH from [facility] called wanting ICD 10 codes to be able to run pt.'s pap smear gave her Z11.3 and Z72.89 codes provided by Maria Victoria BUTTS M.A. Buffy SMITH stated codes went through and had no other questions.

## 2025-02-25 LAB — MIS SERPL-MCNC: 0.4 NG/ML (ref 0.18–11.71)

## 2025-02-27 ENCOUNTER — HOSPITAL ENCOUNTER (OUTPATIENT)
Dept: LAB | Facility: MEDICAL CENTER | Age: 35
End: 2025-02-27
Attending: OBSTETRICS & GYNECOLOGY
Payer: COMMERCIAL

## 2025-02-27 ENCOUNTER — RESULTS FOLLOW-UP (OUTPATIENT)
Dept: SURGERY | Facility: MEDICAL CENTER | Age: 35
End: 2025-02-27

## 2025-02-27 LAB
ABO GROUP BLD: NORMAL
CORTIS SERPL-MCNC: 12.1 UG/DL (ref 0–23)
HPV I/H RISK 1 DNA SPEC QL NAA+PROBE: NOT DETECTED
RH BLD: NORMAL
SPECIMEN SOURCE: NORMAL
THINPREP PAP, CYTOLOGY NL11781: NORMAL

## 2025-02-27 PROCEDURE — 36415 COLL VENOUS BLD VENIPUNCTURE: CPT

## 2025-02-27 PROCEDURE — 86900 BLOOD TYPING SEROLOGIC ABO: CPT

## 2025-02-27 PROCEDURE — 82533 TOTAL CORTISOL: CPT

## 2025-02-27 PROCEDURE — 86901 BLOOD TYPING SEROLOGIC RH(D): CPT

## 2025-03-17 ENCOUNTER — HOSPITAL ENCOUNTER (OUTPATIENT)
Dept: LAB | Facility: MEDICAL CENTER | Age: 35
End: 2025-03-17
Attending: OBSTETRICS & GYNECOLOGY
Payer: COMMERCIAL

## 2025-03-17 LAB
ESTRADIOL SERPL-MCNC: 40.1 PG/ML
FSH SERPL-ACNC: 5.4 MIU/ML

## 2025-03-17 PROCEDURE — 82670 ASSAY OF TOTAL ESTRADIOL: CPT

## 2025-03-17 PROCEDURE — 36415 COLL VENOUS BLD VENIPUNCTURE: CPT

## 2025-03-17 PROCEDURE — 83001 ASSAY OF GONADOTROPIN (FSH): CPT

## 2025-03-18 ENCOUNTER — APPOINTMENT (OUTPATIENT)
Dept: LAB | Facility: MEDICAL CENTER | Age: 35
End: 2025-03-18
Payer: COMMERCIAL

## 2025-03-19 ENCOUNTER — APPOINTMENT (OUTPATIENT)
Dept: LAB | Facility: MEDICAL CENTER | Age: 35
End: 2025-03-19
Payer: COMMERCIAL

## 2025-04-04 ENCOUNTER — HOSPITAL ENCOUNTER (OUTPATIENT)
Dept: LAB | Facility: MEDICAL CENTER | Age: 35
End: 2025-04-04
Attending: OBSTETRICS & GYNECOLOGY
Payer: COMMERCIAL

## 2025-04-04 PROCEDURE — 86706 HEP B SURFACE ANTIBODY: CPT

## 2025-04-04 PROCEDURE — 87389 HIV-1 AG W/HIV-1&-2 AB AG IA: CPT

## 2025-04-04 PROCEDURE — 86803 HEPATITIS C AB TEST: CPT

## 2025-04-04 PROCEDURE — 87591 N.GONORRHOEAE DNA AMP PROB: CPT

## 2025-04-04 PROCEDURE — 36415 COLL VENOUS BLD VENIPUNCTURE: CPT

## 2025-04-04 PROCEDURE — 87340 HEPATITIS B SURFACE AG IA: CPT

## 2025-04-04 PROCEDURE — 86780 TREPONEMA PALLIDUM: CPT

## 2025-04-04 PROCEDURE — 87491 CHLMYD TRACH DNA AMP PROBE: CPT

## 2025-04-05 LAB
C TRACH DNA SPEC QL NAA+PROBE: NEGATIVE
HBV SURFACE AB SERPL IA-ACNC: 275 MIU/ML (ref 0–10)
HBV SURFACE AG SER QL: NORMAL
HCV AB SER QL: NORMAL
HIV 1+2 AB+HIV1 P24 AG SERPL QL IA: NORMAL
N GONORRHOEA DNA SPEC QL NAA+PROBE: NEGATIVE
SPECIMEN SOURCE: NORMAL
T PALLIDUM AB SER QL IA: NORMAL

## 2025-05-27 DIAGNOSIS — F41.9 ANXIETY: ICD-10-CM

## 2025-05-27 RX ORDER — CITALOPRAM HYDROBROMIDE 20 MG/1
20 TABLET ORAL DAILY
Qty: 90 TABLET | Refills: 3 | Status: SHIPPED | OUTPATIENT
Start: 2025-05-27

## (undated) DEVICE — KIT ANESTHESIA W/CIRCUIT & 3/LT BAG W/FILTER (20EA/CA)

## (undated) DEVICE — SUTURE 2-0 PDS II CT-2 - (36/BX)

## (undated) DEVICE — BANDAGE ELASTIC 6 HONEYCOMB - 6X5YD LF (20/CA)"

## (undated) DEVICE — Device

## (undated) DEVICE — COTTON ROLL 1 POUND BIOSEAL - (5RL/CA)

## (undated) DEVICE — PADDING CAST 6 IN STERILE - 6 X 4 YDS (24/CA)

## (undated) DEVICE — GOWN WARMING STANDARD FLEX - (30/CA)

## (undated) DEVICE — MASK, LARYNGEAL AIRWAY #4

## (undated) DEVICE — SUTURE 3-0 ETHILON FS-1 - (36/BX) 30 INCH

## (undated) DEVICE — CATHETER IV 20 GA X 1-1/4 ---SURG.& SDS ONLY--- (50EA/BX)

## (undated) DEVICE — SLEEVE VASO CALF MED - (10PR/CA)

## (undated) DEVICE — PADDING CAST 4 IN STERILE - 4 X 4 YDS (24/CA)

## (undated) DEVICE — SUTURE GENERAL

## (undated) DEVICE — ELECTRODE 850 FOAM ADHESIVE - HYDROGEL RADIOTRNSPRNT (50/PK)

## (undated) DEVICE — KIT  I.V. START (100EA/CA)

## (undated) DEVICE — WATER IRRIGATION STERILE 1000ML (12EA/CA)

## (undated) DEVICE — SET LEADWIRE 5 LEAD BEDSIDE DISPOSABLE ECG (1SET OF 5/EA)

## (undated) DEVICE — TUBING CLEARLINK DUO-VENT - C-FLO (48EA/CA)

## (undated) DEVICE — SPLINT PLASTER 5 IN X 30 IN - (50EA/BX 6BX/CA)

## (undated) DEVICE — TUBE CONNECTING SUCTION - CLEAR PLASTIC STERILE 72 IN (50EA/CA)

## (undated) DEVICE — PROTECTOR ULNA NERVE - (36PR/CA)

## (undated) DEVICE — SUTURE 3-0 MONOCRYL PLUS PS-1 - 27 INCH (36/BX)

## (undated) DEVICE — SET EXTENSION WITH 2 PORTS (48EA/CA) ***PART #2C8610 IS A SUBSTITUTE*****

## (undated) DEVICE — ELECTRODE DUAL RETURN W/ CORD - (50/PK)

## (undated) DEVICE — HEAD HOLDER JUNIOR/ADULT

## (undated) DEVICE — BANDAGE ELASTIC 4 HONEYCOMB - 4"X5YD LF (20/CA)"

## (undated) DEVICE — CANISTER SUCTION 3000ML MECHANICAL FILTER AUTO SHUTOFF MEDI-VAC NONSTERILE LF DISP  (40EA/CA)

## (undated) DEVICE — PACK LOWER EXTREMITY - (2/CA)

## (undated) DEVICE — MASK ANESTHESIA ADULT  - (100/CA)

## (undated) DEVICE — SUCTION INSTRUMENT YANKAUER BULBOUS TIP W/O VENT (50EA/CA)

## (undated) DEVICE — CANISTER SUCTION RIGID RED 1500CC (40EA/CA)

## (undated) DEVICE — CHLORAPREP 26 ML APPLICATOR - ORANGE TINT(25/CA)

## (undated) DEVICE — SENSOR SPO2 NEO LNCS ADHESIVE (20/BX) SEE USER NOTES

## (undated) DEVICE — TOURNIQUET CUFF 34 X 4 ONE PORT DISP - STERILE (10/BX)

## (undated) DEVICE — LACTATED RINGERS INJ 1000 ML - (14EA/CA 60CA/PF)

## (undated) DEVICE — SODIUM CHL IRRIGATION 0.9% 1000ML (12EA/CA)

## (undated) DEVICE — TOURNIQUET CUFF 24 X 4 ONE PORT - STERILE (10/BX)

## (undated) DEVICE — TOWEL STOP TIMEOUT SAFETY FLAG (40EA/CA)

## (undated) DEVICE — NEPTUNE 4 PORT MANIFOLD - (20/PK)